# Patient Record
Sex: MALE | Race: WHITE
[De-identification: names, ages, dates, MRNs, and addresses within clinical notes are randomized per-mention and may not be internally consistent; named-entity substitution may affect disease eponyms.]

---

## 2020-08-18 NOTE — NUR
PATIENT ADMITTED FROM ER WITH UTI, DELIRIUM. PATINT CONFUSED, CAN ANSWER SOME
QUESTIONS ASKED. HAD TO CALL WIE TO FINISH THE ADMISSION.], WIFE WILL BE HERE
TOMORROW TO SIGN MEDICARE FORM AND FALL FORM, CONSENT FOR TREATMENT AND
PATIENT RIGHTS VERBALLY SIGNED IN ER. PATIENT C/O PAIN WITH LOW BACK, 6/10.
PATIENT IS A FALL RISK, FELL YESTERDAY. WIFE REPORT PATIENT HAS HAD NO FOOD X
2 DAYS OR MEDS X 2 DAYS. ADMISSION DONE, WILL REPORT TO BONIFACIO/SURY.

## 2020-08-18 NOTE — EKG
[facility]
[address]
[place]   [address]                     ELECTROCARDIOGRAM REPORT      
_______________________________________________________________________________
 
Name:       IRIS HAND      Room #:                     [facility]#:      [id number]                       Account #:      [id number]  
Admission:  20    Attend Phys:                          
Discharge:              Date of Birth:  [birth year]  
                                                          Report #: [id number]
                                                                    [id number]
_______________________________________________________________________________
THIS REPORT FOR:  
 
cc:  FAM - Family physician unknown
     FAM - Family physician unknown
     Pranav Cruz MD                                            ~
THIS REPORT FOR:   //name//                          
 
                         [facility] ED
                                       
Test Date:    2020               Test Time:    14:11:22
Pat Name:     IRIS HAND           Department:   
Patient ID:   [id number]            Room:          
Gender:                               Technician:   SATYA THOMASULISSES
:          1945               Requested By: Brent Riggs
Order Number: [id number] MD:   Pranav Cruz
                                 Measurements
Intervals                              Axis          
Rate:         77                       P:            -30
MS:           121                      QRS:          -86
QRSD:         150                      T:            18
QT:           429                                    
QTc:          486                                    
                           Interpretive Statements
Sinus rhythm
Probable left atrial enlargement
RBBB and LAFB
Left ventricular hypertrophy
Inferior infarct, old
Lateral leads are also involved
Baseline wander in lead(s) V2
Compared to ECG 2015 09:23:19
Left anterior fascicular block now present
Left ventricular hypertrophy now present
Myocardial infarct finding still present
Electronically Signed On 2020 16:08:32 CDT by Pranav Cruz
[email or web address]
 
 
 
 
 
 
 
 
  <ELECTRONICALLY SIGNED>
   By: Pranav Cruz MD        
  20     1608
D: 20 141                           _____________________________________
T: 20 141                           Pranav Cruz MD          /EPI

## 2020-08-19 NOTE — NUR
PT ADMITTED RELATED TO UTI, AMS, FALLS, AND DELIRIUM. CM ATTEMPTED PC TO PT'S
SPOUSE WHO WAS AT BEDSIDE. CM EXPLAINED CM ROLE. SHE INDICATED THAT SHE IS TOO
OVERWHELMED TO SPEAK TO CM AT THIS TIME. CM REVIEWED PT'S CHART AND STHERAPY
EVALS INDICATE THAT  PT. LIVES WITH WIFE IN A 2 STORY HOME, [address]. PT. PT.
REQUIRES DEPENDENT ASSIST WITH BATHING, DRESSING, AND TOILETING AT BASELINE.
INDEP. WITH TRANSFERS AND EATING AT BASELINE. SPOUSE COMPLETES IADLS. CM TO
ATTEMPT TO FOLLOW UP WITH PT AND SPOUSE REGARDING POSSIBLE DC NEEDS. DR CAMERON
SAW PT AND INDICATED THAT [facility] DOESN'T HAVE ANY OPEN BEDS AND THAT PT ISN'T
APPROPRIATE FOR ADMISSION AT THIS TIME. AWAITING DETERMINATION REGARDING
TREATMENT AND PLAN OR CARE. CM TO FOLLOW AS INDICATED WITH DC PLANNING.

## 2020-08-19 NOTE — NUR
Received awake on bed. Pt very irritable and verbally aggressive to staff;
very confused. On MS, not on telemetry. On room air. On regular diet-
tolerating well, able to feed himself; no nausea, no vomiting and no abdominal
pain ntoed. On blood sugar monitoring- taken and recorded accordingly; with
sliding scale insulin ordered- given as prescribed. Incontinent of bowel and
bladder, checked frequently and changed as needed. Falls bundle in place- pt
fell at home. With SL at  AC. Assisted in ADLs.
A/w PT/OT/ST/Psych evaluation.

## 2020-08-19 NOTE — NUR
ASSUMED PT CARE AT 1938. PT IS ALERT TO SELF AND FORGETFUL. PT IS PLEASANTLY
CONFUSED ONCE REDIRECTED. PT IS BEDREST. IN THE MIDDLE OF THE NIGHT THE PT HAD
MOMENTS OF CONFUSION. PT CALLS OUT IN THE HALLWAY. WHEN REPOSITIONING THE PT
COMPLAINED OF PAIN AT A LEVEL 7. WHEN OFFERD MEDS FOR PAIN HE REFUSED TO TAKE
ANYTHING FOR PAIN. WHEN I TRIED TO DISCUSS MEDS WITH HIM HE TOLD ME THAT HE
DID NOT NEED ANYTHING FROM ME BECAUSE HE AHS HIS OWN MEDS. I WAS TOLD IN
REPORT THAT HIS WIFE WOULD  HOME MEDS AND [place]ALLET ON 8/19/20 AROUND 9AM.
PT REFUSED TO TAKE HIS IV ABX UNTIL AFTER 10PM. PT IS RESTING IN HIS ROOM WILL
CONTINUE TO MONITOR.

## 2020-08-20 NOTE — NUR
PATIENT AOX1 CONFUSED AND FORGETFUL. PATIENT TOOK MEDS WITH ALOT OF
ENCOURAGEMENT. PATIENT WAS YELLING WITH INCREASED ANXIETY LOOKING FOR THE
WIFE. PATIENT WAS NOT EASY TO REDIRECT PRN HADOL GIVEN PER DR. ORDER. PATIENT
ENCOURAGED FLUIDS. PATIENT DENIED PAIN OR DISCOMFORT. PATIENT IN BED ASLEEP AT
THIS TIME BREATHING REGULAR AND UNLABOURED.

## 2020-08-20 NOTE — NUR
Assumed pt care this am, was mostly pleasant through out the day diet and
medications are tolerated well. Wife at the bed side, psych meds are with good
effect on the mood. Incontinent of bowel and bladder, aimee care given. Wife
at the bedside. Repostioned and frequent turns done. Blood sugar checks were
refused at times. POC followed, endorsed to the night nurse.

## 2020-08-20 NOTE — NUR
STILL AWAITING CULTURES. DR. CAMERON FOLLOWING NEWLY PERSCRIBED MEDS. CM TO
FOLLOW AS INDICATED WITH DC PLANNING.

## 2020-08-21 NOTE — NUR
ASSUMED CARE OF PATIENT AT SHIFT CHANGE. ASSESSMENT AS CHARTED. MEDICATIONS
GIVEN PER MAR. VSS. PATIENT IS ALERT TO HIMSELF. PATIENT IS INCONTINENT OF
BOWEL AND SOMETIMES BLADDER. FSBS ARE HIGH; INSULIN GIVEN AS ORDERED. PATIENT
HAS A GOOD APPETITE. C/O PAIN ON BOTTOM. PATIENT IS BEING TURNED EVERY 2
HOURS. ABX GIVEN FOR UTI. URINE IS LIGHTER AND LESS FOUL SMELLING. SPOUSE IS
AT BEDSIDE HELPING W ADL'S. PATIENT VOICES NO OTHER NEEDS. WILL CONTINUE TO
MONITOR

## 2020-08-21 NOTE — NUR
PATIENT ASSESSED AND IS ALERT X 1. SKIN WARM AND DRY. RESP EVEN AND UNLABORED.
TAKES MEDS OK. BUT YELLS OUT FOR WIFE MOST OF THE NIGHT. LUNGS CTA-DISM. HAD 3
LOOSE STOOLS LAST NIGHT INCONT. ALSO WAS INCONTOF URINE X 3. LEFT SL FLUSHES
WELL. IS ON BEDREST. INCONT OF BOWEL NAD BLADDER. HAS A UTI. LEFT AC SL
FLUSHES WELL. AND LOOKS HEALTHY. BED ALARM ON. IS IMPULSIVE AT TIMES. LUNGS
CTA-DISM,. TAKES MEDS WHOLE WITH WATER. HALDOL GIVEN AT HS FOR YELLING ALL THE
TIME. FINALLY WENT TO SLEEP AT 0400. DENIES  AND NEEDS. CONT PLAN OF CARE.
CONT ANTIBIOTIC THERAPY.

## 2020-08-21 NOTE — HC
[facility]
[address]
[place]   [address]                     CONSULTATION                  
_______________________________________________________________________________
 
Name:       IRIS HAND      Room #:         461-P       ADM IN  
M.R.#:      [id number]                       Account #:      [id number]  
Admission:  08/18/20    Attend Phys:    Silvestre Delgadillo MD     
Discharge:              Date of Birth:  [birth year]  
                                                          Report #: [id number]
                                                                    [id number]   
_______________________________________________________________________________
THIS REPORT FOR:  
 
cc:  MYRTLE - Family physician unknown
     MYRTLE - Family physician unknown                                       
     Florentino Mensah MD                                         ~
CC: MYRTLE unknown
    Silvestre Delgadillo
 
DATE OF SERVICE:  08/20/2020
 
 
ENDOCRINE CONSULTATION NOTE
 
CONSULTING PHYSICIAN:  Dr. Delgadillo.
 
REASON FOR CONSULTATION:  Uncontrolled type 2 diabetes mellitus.
 
HISTORY OF PRESENT ILLNESS:  This is a 75-year-old male patient whose medical
background is significant for multiple issues including hypertension,
hyperlipidemia, type 2 diabetes mellitus, as well as CAD, status post CABG, who
presented to [facility] with behavioral changes, agitation, as
well as tendency for falls.
 
The patient has had type 2 diabetes mellitus for several years and is maintained
on a regimen of glimepiride 4 mg daily, metformin 1000 mg daily and sitagliptin
50 mg daily.  The patient reports adequately controlled blood glucose values
mostly in the low to mid 100 mg/dL range without issues of hypoglycemia.  The
patient is not aware of difficulties pertaining to diabetic retinopathy or
neuropathy.  He is not aware of CKD difficulties.  However, he does have CAD and
is status post coronary artery bypass graft.
 
He is also hyperlipidemic and is maintained on atorvastatin 20 mg daily.  He has
hypertension and is maintained on carvedilol 25 mg b.i.d. as well as furosemide
40 mg daily.
 
REVIEW OF SYSTEMS:
CONSTITUTIONAL:  Fatigue, tiredness.  No weight changes, fever or chills.
HEENT:  Negative for sore throat, sinus pain or ear drainage.
PULMONARY:  Negative for shortness of breath, cough or hemoptysis.
CARDIAC:  Negative for chest pain, palpitations, syncope or presyncope.
GASTROINTESTINAL:  Negative for abdominal pain, nausea, or vomiting.
NEUROLOGY:  Negative for loss of consciousness, frequent severe headaches or
seizure activity.
DERMATOLOGY:  Negative for rash, ulceration or skin discoloration.
Otherwise, his review of systems noncontributory other than those mentioned in
HPI.
 
 
 
[facility]
[address] [place]   [address]                     CONSULTATION                  
_______________________________________________________________________________
 
Name:       YAZANIRIS XAVIER      Room #:         461-P       [facility] [place]  
[facility].#:      [id number]                       Account #:      [id number]  
Admission:  08/18/20    Attend Phys:    Silvestre Delgadillo MD     
Discharge:              Date of Birth:  [birth year]  
                                                          Report #: [id number]
                                                                    [id number]   
_______________________________________________________________________________
 
PAST MEDICAL HISTORY:
1.  Type 2 diabetes mellitus.
2.  Hypertension.
3.  Hyperlipidemia.
4.  Coronary artery disease, status post coronary artery bypass graft.
5.  Glaucoma.
6.  History of peptic ulcer disease.
 
OUTPATIENT MEDICATIONS:  Include:
1.  Famotidine 20 mg b.i.d.
2.  Losartan 50 mg daily.
3.  Metformin XR 1000 mg daily.
4.  Atorvastatin 20 mg daily.
5.  Lasix 40 mg daily.
6.  Carvedilol 25 mg b.i.d.
7.  Glimepiride 4 mg daily.
8.  ____ 5 mL.
9.  Aspirin 81 mg daily.
10.  Sitagliptin 50 mg daily.
11.  Xarelto 20 mg daily.
12.  Lumigan 0.01% in evenings.
13.  Vitamin D3 1000 units b.i.d.
 
ALLERGIES:  No known drug allergies.
 
FAMILY HISTORY:  Noncontributory.
 
SOCIAL HISTORY:  The patient is , lives with his wife.  Denies use of
tobacco, alcohol or illicit drugs.
 
PHYSICAL EXAMINATION:
GENERAL:  Pleasant  male patient who is not in apparent pain or
distress.
VITAL SIGNS:  Blood pressure is 132/68 mmHg, heart rate is 69 beats per minute,
respirations 18 per minute, temperature 36.7 degrees Celsius.
CONSTITUTIONAL:  The patient is lying in bed, seems comfortable, not in pain or
distress.
HEENT:  Anicteric sclerae.  Intact extraocular motions.
NECK:  Supple, without JVD or thyromegaly.
CHEST:  Noted for moderate air entry bilaterally with scattered rales.  No
wheezes or crackles.
HEART:  Regular rate and rhythm without murmurs or gallops.
ABDOMEN:  Soft, lax.  No tenderness or organomegaly, has active bowel sounds.
EXTREMITIES:  Lower extremity exam is noted for trace ankle edema bilaterally
without open wounds or skin breaks.
 
 
 
[place] [address]                     CONSULTATION                  
_______________________________________________________________________________
 
Name:       IRIS HAND      Room #:         461-P       ADM IN  
M.R.#:      [id number]                       Account #:      [id number]  
Admission:  08/18/20    Attend Phys:    Silvestre Delgadillo MD     
Discharge:              Date of Birth:  [birth year]  
                                                          Report #: [id number]
                                                                    [id number]   
_______________________________________________________________________________
NEUROLOGIC:  Awake, alert, moves all extremities spontaneously, largely
nonfocal.
PSYCHIATRIC:  Interactive, pleasant.  Normal mood and affect.
 
LABORATORY RESULTS:  Blood glucose values on arrival was 291, then 386 and most
recently at 112, then 174 mg/dL.  Sodium 140, potassium 3.5, chloride 104, CO2
of 23, anion gap 13, BUN 21, creatinine 0.9, glucose 278, total bilirubin 0.9,
calcium 8.9, magnesium 1.7, alkaline phosphatase 110, ALT 30, total protein 6.9,
albumin 3.2.  EGFR 83.  Lactic acid 1.1.  .  INR 1.0.  White blood count
10.8, hemoglobin 13.3, hematocrit 39.7, platelets 227.  TSH 1.418.  Hemoglobin
A1c is 10.8%.  Vitamin D 19.7.
 
ASSESSMENT AND PLAN:
1.  Type 2 diabetes mellitus.  As noted above, the patient has an uncontrolled
baseline as per his hemoglobin A1c, which is in disconnect with his reported
well controlled blood glucose values.  Hemoglobin A1c is 10.8%.  Since
admission, the patient has been gradually placed back on his outpatient regimen
including with metformin 1000 mg daily as well as glimepiride 4 mg daily.  His
blood glucose values have responded adequately with ____ gradually into target
range.  I would like to introduce linagliptin in lieu of Januvia at 5 mg daily. 
As I do so, I will taper down his glimepiride to 2 mg daily to avoid possibility
of hypoglycemia.  I will maintain the Humalog supplemental scale in low
intensity to be utilized as needed for hyperglycemia as we continue to check his
blood glucose values a.c. and at bedtime.
2.  Hypertension.  The patient's level of blood pressure control is adequate, he
is to continue with the current regimen.
3.  Hyperlipidemia.  The patient is maintained on atorvastatin therapy and
tolerates it well, he is to continue with the same.
4.  Vitamin D deficiency, moderate, the patient would benefit from high dose
vitamin D therapy and follow up to ensure the attainment of adequate vitamin D
levels.
 
I certainly appreciate this consultation by Dr. Delgadillo.
 
I have reviewed the patient's clinical care notes, laboratory data and other
pertinent clinical information for over 35 minutes in addition to my
conversation an encounter with the patient.
 
 
 
 
 
 
 
  <ELECTRONICALLY SIGNED>
   By: Florentino Mensah MD         
  08/21/20     1303
D: 08/20/20 1124                           _____________________________________
T: 08/20/20 1236                           Florentino Mensah MD           /nt

## 2020-08-22 NOTE — NUR
PATIENT ALERT AND ORIENTED X1. CONFUSED AND AGITATED. YELLING OUT FOR HIS WIFE
AND ANYTHING THAT HE NEEDS. ATTEMPTED TO REORIENT SEVERAL TIMES. INCONTINENT
OF B/B. GIVEN HALDOL AND SEROQUEL WHICH CALMS PATIENT FOR A COUPLE OF HOURS.
INCREASED BLOOD PRESSURE TREATED. WILL MONITOR.

## 2020-08-22 NOTE — NUR
Received awake on bed, verbally abusive and trying to hit RN and PCA, tried to
talk to the patient to calm him down but he remained aggressive and restless-
Dr Arita informed; PRN haldol given. On room air. Vital signs stable. On
regular diet- tolerating well; no nausea, no vomiting and no abdominal pain.
Able to swallow meds w/o difficulty. Incontinent of bowel and bladder, checked
regularly and changed as needed. With SL at L wrist- intact and flushing well;
on IV antibiotics. Pt calmed down for a while after AM dose of haldol but
became very restless again this afternoon, wife present in the room- Dr Arita informed and seen patient as well; PRN medication given as
prescribed. On blood sugar monitoring taken and recorded; with sliding scale
insulin ordered- given as prescribed. A/w for Dr Carter's rounds.
To continue monitoring patient.

## 2020-08-23 NOTE — NUR
ASSUMED CARE OF PATIENT AT SHIFT CHANGE. ASSESSMENT AS CHARTED. MEDS GIVEN PER
MAR. BP ELEVATED BUT IMPROVED AFTER RETAKING. MENTAL STATUS CHANGED OFTEN
TODAY. PROVIDER NOTIFIED. PT IS NOW A&OX4; ATE 75% OF MEAL AND SEEMS TO BE
IMPROVING. VOICES NO PAIN OR OTHER NEEDS AT THIS TIME. ABX INFUSING. FALL
PRECAUTIONS IN PLACE. SPOUSE AT BEDSIDE. WILL CONTINUE TO MONITOR

## 2020-08-23 NOTE — NUR
PATIENT ALERT AND ORIENTED X1. REMAINS ON BEDREST. BS MONITORED PER ORDER.
CONFUSION OFF AND ON DURING THE DAY AND NIGHT. MEDICATED WITH SEROQUEL AND
HALDOL PRN. BP ELEVATED AT TIMES, PRN AVAILABLE. WILL MONITOR.

## 2020-08-24 NOTE — NUR
UPDATE; ASSUMED CARE OF PATIENT AT SHIFT CHANGE. ASSESSMENT AS CHARTED. MEDS
GIVEN PER MAR. VSS. PATIENT STARTED THE MORNING CLEAR AND ALERT HOWEVER
DECLINED VERY QUICKLY AFTER SPAEKING W BEHAVIORAL HEALTH TEAM. PATIENT REFUSED
HIS MEDICATIONS AND CARE. PATIENT WAS TRYING TO PULL HIS IV OUT. COVID TEST
HAS BEEN DONE. PATIENT TO TRANSFER TO [facility] UPON COVID RESULTS. SPOUSE AT
BEDSIDE. WILL COTINUE TO MONITOR AND FOLLOW PLAN OF CARE

## 2020-08-24 NOTE — NUR
PATIENT ALERT AND ORIENTED TO SELF. HE IS MUCH MORE CLEAR TONIGHT WITH HIS
THINKING. HAS NOT BEEN YELLING OUT AND FOLLOWS SIMPLE COMMANDS. SLEEPING MOST
OF THE NIGHT AT TIME OF NOTE. WIFE AT BEDSIDE IN EARLY EVENING. WILL MONITOR.
RESTING QUIETLY. INCONTINENT OF BM X1.

## 2020-08-25 NOTE — NUR
COVID TEST RESULTS TANESHA BACK AND NEGATIVE. PT IS AGAIN A LITTLE CLEARED THIS
AM POTENTIALLY CONSENTING TO ADMISSION TO [facility] THIS DAY. SPOUSE IS COMPLETEING
AFFIDAVIT. CM TO FOLLOW AS INDICATED WITH DC PLANNING.

## 2020-08-25 NOTE — NUR
Received awake on bed. Due medications given as prescribed, able to swallow
meds w/o difficulty. On room air. Vital signs stable. On MS, not on telemetry;
no complaints of chest pain, crushing sensation and heaviness. On regular
diet- tolerating well; no nausea, no vomiting and no abdominal pain noted.
On blood sugar monitoring, taken and recorded accordingly; with sliding scale
insulin ordered- given as prescribed. Able to have a bowel movement today-
charted. Incontinent of bowel and bladder- checked frequently and changed as
needed. Falls bundle in place. Turned on his sides regularly, may refuse at
times. With SL at L FA- intact. Pt with episodes of confusion and agitation;
verbally abusive to staff. Possible discharge SBU, a/w affidavit to be signed
by pt's wife. Covid swab results came back- negative- pt's wife informed as
well as Dr Arita, physician present in the worthy when Covid result came out.
Assisted in ADLs.
To continue monitoring patient.
Pt seen and examined by Dr Carter, physician talked to pt's wife.
Still a/w discharge disposition from hospitalist and CM input.
Pt complained of pain on his buttocks- his hemmorrhoids- Dr Arita informed;
prescription obtained- medication given as prescribed.

## 2020-08-25 NOTE — NUR
CARE ASSUMED AT 1900 PATIENT WAS CONFUSED AND YELLING.PATIENTN AOX1
THIS SHIFT.PATIENT WAS TALKING TO UNSEEN OTHER. ANXIETY NOTED, JOSE GOLDEN
ORDER OF ATIVAN.PATIENT ENCOURAGED FLUIDS. PATIENT INCONTIENT PERICARE AND
BARRIER CREAM APPLIED.  FALL PRECAUTION IN PLACE. PATIENT IN BED ASLEEP AT
THIS TIME BREATHING REGULAR AND UNLABOURED.

## 2020-08-26 NOTE — NUR
OVIDIO completed 96 hour documents for pt yesterday evening. There was not a
notary in the building so OVIDIO wrote a note to the court and to Kurt Perkins that
several attempts to locate one were made, but they had all left for the day.
OVIDIO then faxed these documents to the court.
 
OVIDIO paged the  on duty for a notary.
 
SW team will continue to follow pt during his stay on this unit.,

## 2020-08-26 NOTE — NUR
PATIENT WAS IN BED AWAKE WHEN CARE ASSUMED. PATIENT IS ALERT, AND ORIENTED X
3-4  ABLE TO VOICE NEED. PATIENT IS CALM, PLEASANT COOPERATIVE WITH CARE.
PATIENT REQUIRES ASSIST OF 2-3 STAFF TO TRANSFER. PATIENT ASSISTED TO
[facility] FOR BREAKFAST. PATIENT WAS VERY HAPPY TO GET OUT OF BED, " IT FEELS
GREAT TO GET OUT BED I HAVE NOT GOTTEN OUT OF BED IN A LONG TIME". PATIENT
CONSUMED ABOUT 75% BREAKFAST.  PATIENT HAS HAD LOOSE STOOL X 2, WOUND NOTED TO
BUTTOCKS AREA, NURSE PRACTIONER (SOCORRO) NOTIFIED FOR POSSIBLE WOUND CARE
CONSULT, DR. NEWMAN AWARE. PATIENT PUT BACK TO BED AFTER GOOD PERICARE BY
STAFF TO GET PRESSURE OFF BUTTOCKS. LUNCH SERVED AT BEDSIDE, HE CONSUMED ABOUT
75%. BLOOD SUGAR BEFORE BREAKFAST 100, FOR LUNCH BLOOD SUGAR IS 96, NO INSULIN
GIVEN AT THIS TIME. PATIENT DENIES SUICIDAL/HOMICIDAL IDEATION. PATIENT DENIES
DEPRESSION/ANXIETY "IT JUST FEEL GREAT TO TALK TO PEOPLE". PATIENT CONCERN IS
"FALL". PATIENT DENIES HAVING PHYSICAL PAIN, DENIES AUDITORY/VISUAL
HALLUCINATION. CURRENTLY IN BED RESTING. AFFECT IS APPROPRIATE, MOOD IS
DEPRESSED. NO AGGRESSION OR AGITATION NOTED.  NO SIGN OF ACUTE DISTRESS NOTED
AT THIS TIME, WILL MONITOR FOR SAFETY.

## 2020-08-26 NOTE — NUR
Assumed care of patient this pm shift. Patient was a new admit to this floor
and came to [facility] around 1800 hours. Patient was yelling and stated that he does
not belong on a mental health floor. Patient was very frustrated at the
beginning of the shift. Patient does have a quarter sized pressure wound in
the crack of his upper buttock on the right. Patient also had diarrhea this
shift that was green and slimy in nature with no apparent smell. Patient is
being seen for dementia, hallucinations, and thoughts of impending doom such
as being in an airplane crash. Patient has also been verbally and physically
aggressive (info from patients notes). Patient will be a 96 hour hold per
affidavits. Patient is considered a falls risk per cates scale. Patient is
alert and oriented x2, person and place. We will attempt to help balance
patients medications for optimal living. We will continue to monitor per
hospital protocol.

## 2020-08-27 NOTE — NUR
assumed care evening 8/26 approx 1900. pt lying in bed at change of shift. pt
appears alert and oriented x4, cooperative. pt incontinent of urine assisted
with linen change. pt took hs meds with water willingly, denied complaints. pt
appears to be sleeping soundly with rounding checks. bed alarm on, will
continue to monitor.

## 2020-08-27 NOTE — NUR
ASSUMED CARE FROM DAY SHIFT PT LYNING IN BED, ALERT TO SELF ,BUT CONFUSED TO
PLACE AND SITUATION. PT CALM AND COOPERTIVE ABLE TO TAKE HS PO MEDICATON.,
REPOSITION WITH STAFF ASSISSTANCE  DUE PT INABILITY TO MOVE SELF. SON CALLED
AND WANTED UPDATE ON PT, AND DISCUSSED PLAN OF CARE , VERBALIZED UNDERSTANDING
AND WILL FOLLOW UP IN AM WITH  DOCTOR.

## 2020-08-27 NOTE — NUR
OVIDIO contacted pt's wife. No answer. OVIDIO left [facility].
 
SW team will continue to follow pt during his stay on this unit.,

## 2020-08-27 NOTE — NUR
PATIENT WAS IN BED AWAKE WHEN CARE ASSUMED, ASSISTED UP IN GERICHAIR WITH
ASSIST OF TWO STAFF. PATIENT PROPELLED BY STAFF TO DAYROOM, APPETITE GOOD,
CONSUMED 100% BREAKFST. PATIENT TOOK ALL MORNING MEDICATION WHOLE WITHOUT
DIFFICULTY. PATIENT IS CALM, PLEASANT, COOPERATIVE WITH CARE AT THIS TIME.
PATIENT DENIES SUICIDAL/HOMICIDAL IDEATION, WHEN ASKED ABOUT DEPRESSION,
PATIENT STATES "I DON'T FEEL DEPRESSED, I DON'T THINK I AM ANXIOUS EITHER, I
WOKE UP, TALKING, AND SMILING AT PEOPLE". AFFECT IS FLAT, MOOD IS DEPRESSED,
PT WORKING WITH PATIENT, AND HE IS MAKING PROGRESS PER PT REPORT. NO
SIGN OF ACUTE DISTRESS NOTED AT THIS TIME, WILL MONITOR FOR SAFETY.

## 2020-08-28 NOTE — NUR
Assumed care 0700.  Intervals of irritability, anger. 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  At one point in the
afternoon he was incontinent of stool small amount/smear.  Was adamant about
getting  cleaned up yet he took off his blue pants and brief.  His daughter in
law and wife called for an update-given one briefly.  LAUREN Haile was going to
call them as they mentioned being proactive talking with a  for
emergency guardianship proceeding. At one point he stood up from recliner and
put himself on the floor.  He is very confused.  Oriented only to person.  Was
angry when asked about SI/HI-raised  his voice. 3 staff to clean his buttocks
x 1. A special Low Air Loss bed was ordered and patient was moved to room 517.

## 2020-08-28 NOTE — NUR
Respiratory was called to remind and request of patient's respiratory
treatment for 1900--at 1830.  Reply they would get to pt. as they could.

## 2020-08-28 NOTE — HC
[facility]
[address]
[place]   [address]                     CONSULTATION                  
_______________________________________________________________________________
 
Name:       IRIS HAND      Room #:         521B-B      ADM IN  
M.R.#:      [id number]                       Account #:      [id number]  
Admission:  08/25/20    Attend Phys:    Digna Carter MD     
Discharge:              Date of Birth:  [birth year]  
                                                          Report #: [id number]
                                                                    [id number]   
_______________________________________________________________________________
THIS REPORT FOR:  
 
cc:  MYRTLE - Family physician unknown
     MYRTLE - Family physician unknown                                       
     Florentino Mensah MD                                         ~
CC: MYRTLE unknown
    Digna Carter
 
DATE OF SERVICE:  08/27/2020
 
 
ENDOCRINE CONSULTATION NOTE
 
CONSULTING PHYSICIAN:  Dr. Carter.
 
REASON FOR CONSULTATION:  Type 2 diabetes mellitus.
 
HISTORY OF PRESENT ILLNESS:  This is a 75-year-old male patient whose medical
background is significant for multiple medical issues including type 2 diabetes
mellitus, hypertension, hyperlipidemia, and CAD.
 
The patient was admitted originally to [facility] on 08/18/2020
with multiple falls, encephalopathy, worsening dementia and behavioral changes.
 
The patient's diabetes mellitus dates back to about 15 years and he is
maintained at home on a regimen of glimepiride 4 mg b.i.d., metformin 1000 mg
daily.  He describes well controlled blood glucose values at home without much
issue with hypoglycemia.  However, on entering the hospital, the patient had
some blood glucose fluctuations, which forced a change away from glimepiride on
to Lantus insulin as well as adding linagliptin to his regimen.  The patient was
vague as to whether or not diabetes mellitus was associated with documented
retinopathy or nephropathy; however, he did acknowledge the occurrence of
intermittent difficulties with numbness and tingling affecting his lower
extremities.
 
The patient is also hypertensive and is maintained on losartan 50 mg daily as
well as carvedilol 25 mg b.i.d.  He is hyperlipidemic and is maintained on
atorvastatin 20 mg daily.
 
Given his worsening encephalopathy and agitation, the patient was ultimately
admitted to the [facility] at [facility].
 
REVIEW OF SYSTEMS:
CONSTITUTIONAL:  Intermittent issues with fatigue, tiredness, but no fever or
chills or significant body weight changes.
HEENT:  Negative for sore throat, sinus pain or ear drainage.
 
 
 
[facility]
[address] [place]   [address]                     CONSULTATION                  
_______________________________________________________________________________
 
Name:       IRIS HAND      Room #:         521B-B      ADM IN  
M.R.#:      [id number]                       Account #:      [id number]  
Admission:  08/25/20    Attend Phys:    Digna Carter MD     
Discharge:              Date of Birth:  [birth year]  
                                                          Report #: [id number]
                                                                    [id number]   
_______________________________________________________________________________
PULMONARY:  Occasional shortness of breath, but no cough or hemoptysis.
CARDIAC:  Negative for chest pain, palpitations, syncope or presyncope.
GASTROINTESTINAL:  Negative for abdominal pain, nausea, vomiting.
NEUROLOGY:  Noted for imbalance, frequent falls.  No loss of consciousness or
seizure activity.
DERMATOLOGY:  Negative for rash, ulceration, discoloration or other issues.
Otherwise, review of system is noncontributory other than those mentioned in
HPI.
 
PAST MEDICAL HISTORY:
1.  Type 2 diabetes mellitus.
2.  Hypertension.
3.  Hyperlipidemia.
4.  Coronary artery disease, status post CABG, status post stent placements.
5.  GERD.
6.  Dementia.
 
OUTPATIENT MEDICATIONS:  Include glimepiride 4 mg b.i.d., losartan 50 mg daily,
metformin  taken as 1000 mg daily, atorvastatin 20 mg daily, carvedilol 25
mg b.i.d., Trusopt 5 mL daily, Xarelto 20 mg daily, vitamin D 1000 units b.i.d.,
multivitamin daily, and aspirin 81 mg daily.
 
ALLERGIES:  No known drug allergies.
 
FAMILY HISTORY:  Noncontributory.
 
SOCIAL HISTORY:  The patient is .  Denies use of tobacco, alcohol or
illicit drugs.
 
PHYSICAL EXAMINATION:
GENERAL:  An elderly  male patient who is not in apparent pain or
distress.  He is sitting in his chair comfortably, not in apparent distress.
VITAL SIGNS:  Blood pressure 141/86 mmHg, heart rate is 96 beats per minute,
respirations 20 per minute, temperature 37.1 degrees Celsius.
CONSTITUTIONAL:  The patient appears comfortable, not in apparent distress.
HEENT:  Anicteric sclerae.  Intact extraocular motions.
NECK:  Supple, without JVD.  No thyromegaly.
CHEST:  Noted for moderate air entry bilaterally with scattered rales.  No
wheeze or crackles.
HEART:  Regular rate and rhythm without murmurs or gallops.
ABDOMEN:  Soft, lax.  No guarding.  Active bowel sounds.
EXTREMITIES:  Lower extremity exam is negative for ankle edema.
NEUROLOGIC:  Awake, alert, stands with assistance, uses a walker.
PSYCH:  Flat mood and affect, but cooperative.
 
LABORATORY DATA:  Blood glucose values over the past 48 hours have been noted
 
 
 
[address]   [address]                     CONSULTATION                  
_______________________________________________________________________________
 
Name:       IRIS HAND      Room #:         521B-B      ADM IN  
M.R.#:      [id number]                       Account #:      [id number]  
Admission:  08/25/20    Attend Phys:    Digna Carter MD     
Discharge:              Date of Birth:  [birth year]  
                                                          Report #: [id number]
                                                                    [id number]   
_______________________________________________________________________________
for multiple declines under 100 mg/dL with the lowest being at 63 mg/dL earlier
this morning.  Sodium 142, potassium 4.1, chloride 107, CO2 of 26, anion gap 9,
BUN 24, creatinine 1.0, glucose 60, AST 21, total bilirubin 0.9, calcium 9.2,
magnesium 2.1, alkaline phosphatase 110, ALT 30, total protein 6.9, albumin 3.2,
EGFR 73.  Lactic acid 1.1.  Troponin negative.  White blood count 8.3,
hemoglobin 12.8, hematocrit 38.6, platelets 293.  TSH 1.418.  Hemoglobin A1c
10.8%.
 
ASSESSMENT AND PLAN:
1.  Type 2 diabetes mellitus.  Uncontrolled baseline as per his documented
hemoglobin A1c of 10.8%.  However, he had an issue with intermittent
hypoglycemia during this hospital stay, likely due to the dietary differential
from home.  This forced the stoppage of glimepiride in the past.  Currently, the
patient has had showing some multiple episodes of mild hypoglycemia.  I would
certainly target blood glucose range of 100-180 mg/dL.  I will continue the
current daily Glucophage dose of 1000 mg daily, hold linagliptin with the hopes
that he could potentially go home without needing to start this given its cost
and I will cut back his glimepiride dose to 2 mg twice a day in order to avoid
the issue of hypoglycemia.  Blood glucose monitoring will commence a.c. and at
bedtime and further therapeutic adjustments will be made accordingly.
2.  Hypertension.  The patient's level of blood pressure control is adequate,
continue the current regimen.
3.  Hyperlipidemia.  The patient is maintained on atorvastatin therapy, he is to
continue with the same.
 
I certainly appreciate this consultation by Dr. Carter.
 
 
 
 
 
 
 
 
 
 
 
 
 
 
 
 
 
 
  <ELECTRONICALLY SIGNED>
   By: Florentino Mensah MD         
  08/28/20     1255
D: 08/27/20 1159                           _____________________________________
T: 08/27/20 1657                           MD drake Escudero

## 2020-08-28 NOTE — HC
[facility]
[address]
[place]   [address]                     CONSULTATION                  
_______________________________________________________________________________
 
Name:       IRIS HAND      Room #:         521B-B      ADM IN  
M.R.#:      [id number]                       Account #:      [id number]  
Admission:  08/25/20    Attend Phys:    Digna Carter MD     
Discharge:              Date of Birth:  [birth year]  
                                                          Report #: [id number]
                                                                    [id number]   
_______________________________________________________________________________
THIS REPORT FOR:  
 
cc:  MYRTLE - Family physician unknown
     MYRTLE - Family physician unknown                                       
     Liam De Oliveira MD                                          ~
CC: MYRTLE unknown
    Digna Carter
 
DATE OF SERVICE:  08/27/2020
 
 
WOUND CARE CONSULTATION
 
PERSONAL PHYSICIAN:  Nonstaff.
 
CHIEF COMPLAINT:  Right gluteal ulcer.
 
HISTORY OF PRESENT ILLNESS:  This is a 75-year-old white male who was admitted
initially to [facility] on 08/18/2020 with recurrent falls and
acute toxic metabolic encephalopathy versus worsening dementia.  The patient was
treated for urinary tract infection with IV antibiotics.  While as an inpatient,
the patient was noted to have worsening encephalopathy with agitation. 
Psychiatry was consulted and the patient was then transferred to our acute
inpatient psychiatric unit.  Upon arrival to the unit, the patient was noted to
have a gluteal decubitus ulcer, which we have been asked to follow.  The patient
himself is pleasant at this time and states he does have mild pain associated
with the ulcer, but however, denies any other associated complaints.  The
patient states he has never had any other ulcers that he knows of.
 
PAST MEDICAL HISTORY:  Diabetes mellitus, coronary artery disease, status post
coronary artery bypass grafting, hyperlipidemia.
 
CURRENT MEDICATIONS:  Multiple, I reviewed the patient's medication list.
 
DRUG ALLERGIES:  None.
 
SOCIAL HISTORY:  The patient denies alcohol or tobacco use.  Prior to this
hospitalization was living independently with his wife.
 
FAMILY HISTORY:  Not pertinent to current medical condition.
 
REVIEW OF SYSTEMS:
CONSTITUTIONAL:  The patient denies fevers or chills.
NEUROLOGIC:  The patient has overall generalized weakness, but no isolated
weakness in arms or legs.
EYES:  No complaints.
 
 
 
[facility]
[address]
[place]   [address]                     CONSULTATION                  
_______________________________________________________________________________
 
Name:       IRIS HAND      Room #:         521B-B      ADM IN  
[facility].#:      [id number]                       Account #:      [id number]  
Admission:  08/25/20    Attend Phys:    Digna Carter MD     
Discharge:              Date of Birth:  [birth year]  
                                                          Report #: [id number]
                                                                    [id number]   
_______________________________________________________________________________
ENT:  No complaints.
CARDIAC:  The patient denies chest pain, palpitations, peripheral edema.
RESPIRATORY:  The patient denies shortness of breath, cough or wheezes.
GASTROINTESTINAL:  The patient denies nausea, vomiting, abdominal pain, but does
complain of a hemorrhoid.
GENITOURINARY:  The patient denies urgency or frequency, but recently was
treated for urinary tract infection.
MUSCULOSKELETAL:  No complaints.
SKIN:  The patient has a right stage 3 decubitus ulcer on the buttock.
 
PHYSICAL EXAMINATION:
VITAL SIGNS:  Temperature 37.1, pulse 96, respirations 20, /86.
GENERAL:  This is alert and oriented x 2, to person and place, but not time,
elderly white male who is in no obvious distress.
HEENT:  Normocephalic, atraumatic.  Mucous membranes are dry.  Pupils are round.
 Sclerae are white.
NECK:  Supple, nontender.
LUNGS:  Clear.
HEART:  Regular.
ABDOMEN:  Obese, soft, nontender.
PELVIC:  Evaluation of right superior gluteal region reveals a stage 3 decubitus
ulcer, which is a mix of approximately 80% granulation tissue, 20% slough. 
There is no significant undermining or tunneling.  Periwound is otherwise
intact.  There is no fluctuance.  There is minimal serosanguineous drainage
noted without odor.  There are no other associated ulcerations noted in the
sacrococcygeal region.
EXTREMITIES:  The patient moves all extremities without difficulty.  Bilateral
heels are intact.
NEUROLOGIC:  Cranial nerves 2-12 grossly intact.  Motor and sensory grossly
intact.
 
LABORATORY DATA:  White count 8.3, hemoglobin 12.8, BUN 24, creatinine 1.0,
albumin 3.2.
 
IMPRESSION:
1.  Stage 3 superior right gluteal decubitus ulcer.
2.  History of recent urinary tract infection with associated toxic
encephalopathy.
3.  Diabetes mellitus type 2 with recent hemoglobin A1c of 10.8.
4.  Generalized debility.
5.  History of coronary artery disease.
6.  Protein-calorie malnutrition -- moderate with albumin of 3.2.
 
PLAN:  At this time, we will start Z-Guard protective ointment to the ulceration
twice daily and p.r.n.  We will put the patient on low air loss mattress, having
turned every 2 hours.  We will utilize physical and occupational therapy for
 
 
 
[place] [address]                     CONSULTATION                  
_______________________________________________________________________________
 
Name:       IRIS HAND JR     Room #:         521B-B      ADM IN  
M.R.#:      [id number]                       Account #:      [id number]  
Admission:  08/25/20    Attend Phys:    Digna Carter MD     
Discharge:              Date of Birth:  [birth year]  
                                                          Report #: [id number]
                                                                    [id number]   
_______________________________________________________________________________
strengthening.  We will maximize patient's oral protein supplementation for
healing.  Continue all other current medications.  We will continue to follow
the patient.  I appreciate the ability to consult.
 
 
 
 
 
 
 
 
 
 
 
 
 
 
 
 
 
 
 
 
 
 
 
 
 
 
 
 
 
 
 
 
 
 
 
 
 
 
 
 
 
  <ELECTRONICALLY SIGNED>
   By: Liam De Oliveira MD          
  08/28/20     1456
D: 08/27/20 1629                           _____________________________________
T: 08/27/20 1904                           Liam De Oliveira MD            /nt

## 2020-08-28 NOTE — NUR
MULTIPLE ATTEMPTS TO COMPLETE EVALUATION HAVE BEEN MADE.  PATIENT IS
NON-COMPLIANT AT THIS TIME.  EVALUATION TO BE DEFERRED UNTIL PATIENT IS
MEDICATIONS HAVE BEEN STABILIZED AND BEHAVIORS HAVE BEEN ADDRESSED.

## 2020-08-28 NOTE — NUR
OVIDIO contacted Ana to provide an update on pt's current behavior and the
fact that P/T is recommending a skilled stay. Also, pt's nurse said that an
 would like to come and visit pt. OVIDIO was calling pt's family to
explain the unit is not accepting visitors at this time and suggest an
alternate way such as by video chat. No answer SW left a msg.
 
OVIDIO will continue to follow pt during his stay on this unit.
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
## 2020-08-29 NOTE — NUR
ASSESSMENT:
PT REMAIN ALERT AND ORIENT TIMES ONE. PT THINKS THAT HIS WIFE AND KIDS ARE
PLANNING HIM A SURPRISE GATHERING. HE WANTED TO SNEAK OVER TO HIS WIFES HOTEL
ROOM.  TOLERATING PO INTAKE. TAKE MEDS WHOLE. VSS, AFEBRILE.  DIFFICULT TIME
GETTING PT OUT OF BR, SECURITY WAS CALLED FOR ASSISTANCE. PT REMAINED SAFE
WITH NO FALLS.  ACCU CHECK  THIS SHIFT. 96 HR HOLD WHICH BEGAN 8/26/20.
SLOW PROGRESS TOWARDS DC GOALS, WILL CONTINUE TO MONITOR.

## 2020-08-30 NOTE — NUR
PT ALERT AND ORIENTED TIMES FOUR WITH BLUNTED AFFECT. VSS. DENIES PAIN/SOA.
PT ALSO DENEIS SI/HI/AH/VH. PT UP IN THE DINNING ROOM FOR MEALS. PT HAS LITLLE
INTERACTIONS WITH PEERS. WILL CONTINUE TO MONITOR.

## 2020-08-30 NOTE — NUR
PATIENT ALERT X 2. SKIN WARM AND DRY. RESP EVEN AND UNLABORED. ON CARB CONTROL
DIET. ATE WELL AND TAKEN HIS MEDS WELL.PUT IN BED. NEED ASSIST OF 2 PERSON.
BED ALARM ON. NO YELLING OUT TONIGHT. HAS A SORE ON RIGHT BUTTOCK. NEEDS
TURNED Q 2 HOURS. WAS IN A BETY CHAIR IN DINNING ROOM. ATE SUPPER. IS ON A LOW
AIR MATTRESS.NO VERBAL OR AGGERESION NOTED.NO SI/HI/AVH NOTED THIS SHIFT. HAS
BEED COOPERATIVE THIS SHIFT SO FAR. SLEEPING WELL.CONT PLAN OF CARE.

## 2020-08-30 NOTE — NUR
PATIENT WAS SITTING UP IN DINING ROOM WHEN I CAME ON SHIFT AT 1900. HE WAS
REQUESTING HIS HS MEDS ASAP AND STATING THAT HIS HEMORHOIDS WERE HURTING AND
HE WANTED CREAM FOR THEM. ASSESS PATIENT AND GOT HIS HS MEDS READY AND CALLED
SECURITY TO HELP TRANSFER PATIENT TO BED SINCE HE STATES HES UNABLE TO HELP
MUCH TONIGHT. PATIENT PLACED IN BED AND INCONTINENCE CARE DONE. CREAM APPLIED
TO HIS SCROTUM WHICH IS RED AND HAS EXCORIATED BREAKS IN SKIN. PATIENT WAS
PLACED ON HIS LEFT SIDE. AROUND 2100. PATIENT WOKE AT 2300 SCREAMING AND
PANIC'D. HE STATES THAT HE DOESN'T KNOW WHERE HE IS AND THAT HE JUST HAS TO
GET OUT OF THE BED AND LAY ON THE FLOOR. I TOLD HIM HE IS AT [facility]
AND HE TOLD ME I WAS LYING. HE STATES HE DOESN'T KNOW WHERE HE IS AT BUT HE
KNOWS IT'S NOT WHERE I'M TELLING HIM. THEN HE INSISTED THAT I PUT HIM ON THE
FLOOR. I TOLD HIM I COULDN'T DO THAT BECAUSE WE WOULD NEVER BE ABLE TO GET HIM
UP OFF THE FLOOR. I TRIED TO MAKE HIM MORE COMFORTABLE IN THE BED AND WAS
ADJUSTING THAT BUT HE JUST WANTED OUT OF BED. I CALLED CARLOS HOSKINS ON CALL AND
EXPLAINED PATIENT'S ANXIETY AND TOLD HIM I WAS TRYING TO KEEP HIM OFF HIS
BUTTOCKS SINCE HE'S BEEN UP MOST OF DAY SITTING IN CHAIR AND HE HAS SKIN
BREAKING DOWN. ASKED IF THERE WAS SOMETHING WE COULD GIVE TO CALM HIM AND KEEP
HIM IN BED. HE ORDERED GEODON 15MG IM ONE TIME.  WHILE ON PHONE WITH DOCTOR,
PATIENT MIRACULOUSLY STOOD UP OUT OF BED AND SAT HIMSELF IN A CHAIR. GOT TO
THE BED ALARM GOING OFF AS HE SAT DOWN.  PATIENT REFUSING TO GO BACK TO BED.
SAT PATIENT IN GERICHAIR WITH CHAIR ALARM ON AND SAT IT IN HALLWAY WHERE NURSE
CAN VIEW PATIENT. PATIENT GIVEN WATER ON REQUEST. GEODON INJECTION NOT GIVEN
AT THIS POINT D/T PATIENT IS RESTING QUIETLY. CONTINUING TO MONITOR.

## 2020-08-31 NOTE — NUR
PT UP AT THIS TIME. PT WAS INCON. OF URINE. PT STATED HE DIDN'T MAKE IT FAST
ENOUGH TO USE BATHROOM. PT TALKING ABOUT HOW HE NEEDS THE BED RAIL DOWN TO
HELP HIM GET UP. HE STATED THAT HE NEEDS HELP SITTING UP. PT HAS FADED RASH TO
LEFT SIDE OF ABD, PT HAS REDDNESS TO GROIN, MEDICATION WAS APPLIED AND BARRIER
CREAM. PT CLEANED UP AND PT ABLE TO TURN IN BED. PT TALKED TO WIFE ALSO ON THE
PHONE. PT ASSISTED TO CHAIR X2 STAFF. PT ABLE TO BEAR WT ON FEET, NOT ABLE TO
PIVOT VERY EASILY. PT TAKEN TO DINING ROOM AND ATE BREAKFAST. PT TOOK MED
WHOLE WITH WATER.

## 2020-08-31 NOTE — NUR
PATIENT AWOKE SITTING IN CHAIR AND SAID HE NEEDED TO HAVE BM. PATIENT ASSISTED
BACK TO ROOM AND ONTO BSC. NO RESULTS. PATIENT KEPT WANTING TO GET UP AND DOWN
OUT OF CHAIR AND NOT WANTING TO GO TO BED. PATIENT RESTLESS AND CONFUSED,
ARGUMENTATIVE. GEODON 15MG IM GIVEN IN LEFT HIP. PATIENT WANTED TO SIT IN
RECLINER TO REST. PATIENT MOVED OUT TO HALLWAY WHERE NURSE COULD MONITOR. HE
WAS PLACED IN QUIET PLACE. PATIENT SLEPT FOR ABOUT AN HOUR AND AWOKE. HE
CALLED OUT AND STATES HE WANTS TO KNOW WHAT IS GOING ON. HE SAID HE WANTED TO
BE WITH THE OTHER PATIENT'S. EXPLAINED THAT IT IS 0200 IN THE MORNING AND ALL
ARE SLEEPING. EXPLAINED HE WAS IN THE RECLINER IN THE HALLWAY TO SLEEP SO HE
COULD BE MONITORED IF HE NEEDED ANYTHING. HE WANTED TO WATCH TV. I TOLD HIM
THAT TV IS OFF AT 2200. HE WANTED TO KNOW WHY A DR HAS NOT SEEN HIM SINCE HE'S
BEEN HERE. I EXPLAINED THAT HE HAS BEEN SEEN BY A DR AND HE WILL SEE HIM TODAY
AFTER BREAKFAST. PATIENT KEEPS CALLING OUT AND DEMANDING THINGS. PATIENT WANTS
UP TO BSC AND BACK DOWN. HE WANTS WATER AND THEN DOESN'T WANT WATER. HE WANTS
UP IN CHAIR AND THEN DOWN IN ANOTHER CHAIR. PATIENT STATES HE IS COMFORTABLE.
CONTINUING TO WORK WITH PATIENT THRU THIS DISORIENTATION.

## 2020-08-31 NOTE — NUR
PT TALKED WITH SON PATRICK. PT DIDN'T REALIZE HE WAS AT [facility].
THEN PAGED DR. CAMERON ABOUT PHONE CALL.

## 2020-08-31 NOTE — NUR
OVIDIO contacted Ana on her cell; OVIDIO attempted to call her Friday afternoon
also. Ana again did not answer. OVIDIO left another msg. Pt is due to d/c
today.
 
SW team will continue to follow pt during his stay on this unit.

## 2020-09-01 NOTE — NUR
PT WALKED AROUND THE UNIT WITH PHYSICAL THERAPY. PT DID HAVE A BM, NEEDED
ASSISTANCE WITH WIPEING. PT DIDN'T SEEM CONFUSED TODAY. PT MORE COHEARENT.

## 2020-09-01 NOTE — NUR
PATIENT WAS UP IN DINING ROOM TONIGHT VISITING WITH HIS WIFE ON THE PHONE. HE
WAS HAPPY AND A/0X2-3. HE DENIED PAIN, SI/HI/AVH. PATIENT TOOK HIS HS MEDS
WHOLE WITH WATER. HE HAD HS SNACK. HIS BLOOD SUGAR . PATIENT REQUESTED
TO GO TO BED BEFORE 2200. PATIENT WAS TOILETED ON BS AND HAD SOFT UNFORMED
STOOL. HE IS INCONTINENT OF URINE AND BOWEL. ZGARD PASTE APPLIED TO PRESSURE
SORES ON SCROTUM AND RED AREA IN PERIANAL AREA. PATIENT PLACED IN BED ON CHUX
AND WITHOUT BRIEF TO LEAVE AREA CELE. PATIENT BECAME DISORIENTED AND CONFUSED
LAST NIGHT AND CONTINUES TO DO THIS AGAIN TONIGHT. HE SLEEPS FOR AN HOUR AND
THEN BEGINS YELLING OUT FOR HELP AND WHEN STAFF GETS TO HIM HE JUST WANTS TO
KNOW THE TIME OR HAVE THE LIGHTS ADJUSTED. ONCE HE TOLD ME THAT A LADY IN HER
50'S WHO WORKS WITH HIM IS GOING TO BE COMING IN AROUND 0530 AND THAT SHE IS A
PERSONAL WORKOUT  AND SHE WILL BE GLAD TO WORK WITH THE STAFF TOO ON
WORKING OUT. HE DOESN'T RECALL WHERE HE IS AT. HE WANTS UP OUT OF BED AND THEN
HE WANTS TO GO BACK TO BED. TONIGHT HE IS YELLING THAT HE WANTS TO GET UP AND
WORK AT THE DESK ON PAPERWORK TO HELP OUT. HE STATES HE WAS AN 
BEFORE AND KNOWS WHAT TO DO. HE WANTS ME TO CALL THE DR AND GET PERMISSION TO
ALLOW HIM TO WORK AT THE DESK.  PATIENT HELPED INTO BETY CHAIR WITH ALARM ON
AND IN PLACE AND BROUGHT TO DINING ROOM TO SIT. PATIENT STATES HE DOESN'T WANT
TO SLEEP. WILL GIVE PRN IF PATIENT REFUSES TO QUIET DOWN AND REST. CONTINUING
TO MONITOR.

## 2020-09-01 NOTE — NUR
Assumed pt's care this pm shift. Pt was in the dayroom at time of assessment.
Pt was alert and oriented x4 at time of assessment. Confused. Forgetful. Pt
was calm and cooperative with assessment. Denies anxiety and/or depression. Pt
voiced wanting to go home. Pt was not exit seeking as was in a caroline chair. No
attempts to get out of chair.  Pt took meds whole, stayed in the dayroom,
watched some [employer] baseball and went to bed.
Pt started yelling out at about 2240. Pt voiced he wanted to use the bsc. Pt
helped to bsc. Pt was very anxious at that time and getting agitated.  Voicing
that he did not want to go back to bed. He was scared and anxious. Pt wanted
meds to help with anxiety. PRN haldol given at that time. Pt voicied that he
wanted to sleep  in the recliner out in the dayroom because he was scared. Pt
now out in the dayroom, on the caroline chair sleeping. Pt have 2 small BMs so far
since beginning of shift.  Will continue to monitor.
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
## 2020-09-01 NOTE — NUR
PATIENT SLEPT 2.2 HOURS. PATIENT HAS RED RASH OVER TORSO AND THRU NETTIE AREAS.
ITCHING HAS DECREASED SOME AND HYDROCORTISONE CREAM IS SCHEDULED. DID PUT SOME
ON SOME ITCHING PATCHES TODAY. PT HAD SMALL SOFT UNFORMED BM TODAY. TWO TOTAL
FOR NIGHT. Z MARCELLO PASTE AND BARRIER CREAM APPLIED TO SCROTAL AREA LESIONS
WHICH ARE HEALING. [facility]. A/0 X 1-2.

## 2020-09-01 NOTE — NUR
PT UP IN DINING ROOM. PT HAS DELUSION ABOUT THINKING HE IS ON A ORTHOPEDIC
UNIT. PT TAKING MEDICATION AT THIS TIME WHOLE. PT NEEDS ASSISTANCE WITH
TRANSFERS AND ADL'S. PT LUNGS CLEAR. PT HAS A RASH TO ABD, BACK, BUTTOCKS, AND
PERIAREA. PT STATED HE HAS HEMMORROIDS.

## 2020-09-02 NOTE — NUR
PATIENT WAS SITTING AT A TABLE IN THE DINING ROOM TONIGHT WHEN I CAME ON DUTY
AT 1900. PATIENT IS A/0 X 2-3. PATIENT ASKED FOR THE PHONE TO CALL HIS WIFE.
HIS SON CALLED LATER AND SPOKE WITH HIM. HE DENIES SI/HI/AVH. PATIENT DENIES
PAIN. HE DID HAVE BM TODAY. PATIENT GIVEN ATIVAN .5 MG PO AT HS. PATIENT ASKED
TO USE THE BSC AND WAS HELPED IN USING THAT. PATIENT HAS RASH THRUOUT TORSO
THAT IS IMPROVING AND DENIES ITCHING AT THIS TIME. PT GETTING HYDROCORTISONE
CREAM SCHEDULED.  PATIENT'S PERIANAL/SCROTAL RED BREAKDOWN IMPROVING WITH
ZGARD AND BARRIER CREAM. PATIENT SLEEPS ON LOW LOSS AIR MATTRESS. BED ALARM IS
ON AND SIDERAILS UP X 3-4. PATIENT SPOKE WITH DR PATY PELAYO ABOUT
WANTING TO KNOW IF HE CAN GO HOME SOON.  TOLD HIM IT'S STILL UP IN THE AIR
AND HE NEEDS TO BE ABLE TO SLEEP THRU NIGHT AND IF HE GOES HOME AT THIS POINT
HE WILL NEED OUTSIDE HELP THRU NIGHT.  PATIENT HAS CALLED OUT A COUPLE OF
TIMES TONIGHT. HE SLEPT FOR AN HOUR AND THEN AWOKE CALLING OUT HIS WIFE'S NAME
AND DISORIENTED. PATIENT WAS ALSO INCONTINENT AT THIS TIME. INCONTINENT CARE
DONE AND PATIENT WAS REORIENTED TO PLACE AND TIME AND COMFORTED. PATIENT BACK
TO RESTING AGAIN IN BED. CONTINUING TO MONITOR WITH ROUTINE ROUNDING.

## 2020-09-02 NOTE — NUR
FREQUENT (Q 20-30 MINUTES) REQUESTS TO USE THE BATHROOM-BECOMES UPSET AND
BEGINS TO YELL AND FEEL PERSECUTED WHEN ASKED TO ADHERE TO A Q 2 HR TOILETING
SCHEDULE "YOU JUST WANT TO PICK ON ME" "YOU DON'T LIKE ME" DID ATTEND AM
ACTIVITIES AND APPEARED TO PARTICIPATE. ORIENTED TO PERSON/PLACE NO TO DATE.
REQUIRES SBA X 2-3 TO TRANSFERTO AND FROM CHAIR/COMMODE.

## 2020-09-02 NOTE — NUR
It was discussed in tx team that pt may be clearing up and may be able to sign
a DPOA doc. Dr. Carter said she will talk with pt and assess his cognition.
 
SW spoke to Dr. Carter who said pt is clearing up and clearly can identify what
a DPOA is. She said that if pt continues to get better, he can assign a DPOA
in the next day or 2. He can also decide if he wants to go to a SNF next. She
said she will return pt's son Nikolay ([phone]) who called SW and left a
msg this morning.
 
SW team will continue to follow pt during his stay on this unit.

## 2020-09-03 NOTE — NUR
SW spoke with pt's son Nikolay concerning pt and provided an update. Pt is
currently reviewing DPOA document. Nikolay mentioned him wanting pt to go to
[facility] for rehab. SW explained that pt has already told her that if he
cannot go to  he does not want to go. Nikolay said he will contact pt and
talk with him about rehab. Frank number is [phone].
 
SW team will continue to follow pt during his stay on this unit.

## 2020-09-03 NOTE — NUR
PT CARE ASSUMED AT 0700. A&Ox3. PT VERY CALM UP IN THE GERIRECLINER IN THE DAY
ROOM MOST OF THE MORNING. BM TODAYx2. VALPROIC ACID RESULTS 44 (L). ACHS BS
164 (B), 235 (L) WITH NO COVERAGTE ORDERED. PT/OT/ST ON BOARD. NO BEHAVIORAL
ISSUES NOTED THUS FAR. WOUND CARE COMPLEETED WITH MILD REDNESS NOTED.
SUPPLEMENTS ADDED TO DIET. VITALS STABLE. FALL PROTOCOL IN PLACE. CALL LIGHT
IN REACH. WILL CONTINUE TO MONITOR.

## 2020-09-03 NOTE — NUR
PATIENT HAS SLEPT THRU NIGHT, ONLY AWAKENED ONCE. PATIENT APPEARS COMFORTABLE
WITH EVEN UNLABORED RESPIRATIONS. BED IN LOW POSITION AND BED ALARM IS ON.
CONTINUING TO MONITOR.

## 2020-09-04 NOTE — NUR
PATIENT CARE ASSUMED AT 0700 - SPENT MOST OF DAY IN BETY CHAIR. MULTIPLE
VISITS TO VOID WITH ASSISTANCE. DIFFICULTY WITH WEIGHT BEARING ON HIS LEGS -
TWO PERSON ASSIST NEEDED WITH TRANSFERS AND TOLLETING - PATIENT HAS WOUND ON
BUTTOCKS THAT BARRIER CREAM BEING APPLIED. STATES NO DISCOMFORT - NO DRESSING
ORDERED - +3 EDEMA NOTED BILATERALLY IN LOWER EXTREMITIES. GOOD APPETITE WITH
COMPLETION OF BOTH BREAKFAST AND LUNCH. BLOOD SUGARS HAVE BEEN WITHIN NORM. 94
AT BREAKFAST  AT LUNCH. NEEDS ASSISTANCE WITH TRANSFERRING. PERIODS OF
CONFUSION NOTED.

## 2020-09-04 NOTE — NUR
Assumed care of pt @ 1900. Pt calm et cooperative this shift. Took medications
whole without difficulty. Ambulates via caroline-chair but can stand et transfer.
VSWNL. Accucheck elevated so pt received 10u. of regular insulin this shift.
Health assessment with no abnormalities noted other than previously noted.
Denies SI/HI this shift. Socialized with peers in dayroom until HS. Currently
resting in caroline-chair in dayroom with eyes open. Will continue to monitor per
protocol. DISCHARGE SUMMARY from Nurse    PATIENT INSTRUCTIONS:    After general anesthesia or intravenous sedation, for 24 hours or while taking prescription Narcotics:  · Limit your activities  · Do not drive and operate hazardous machinery  · Do not make important personal or business decisions  · Do  not drink alcoholic beverages  · If you have not urinated within 8 hours after discharge, please contact your surgeon on call. Report the following to your surgeon:  · Excessive pain, swelling, redness or odor of or around the surgical area  · Temperature over 100.5  · Nausea and vomiting lasting longer than 4 hours or if unable to take medications  · Any signs of decreased circulation or nerve impairment to extremity: change in color, persistent  numbness, tingling, coldness or increase pain  · Any questions    What to do at Home:  Recommended activity: Activity as tolerated    If you experience any of the following symptoms uncontrolled pain, please follow up with hospice nurse. *  Please give a list of your current medications to your Primary Care Provider. *  Please update this list whenever your medications are discontinued, doses are      changed, or new medications (including over-the-counter products) are added. *  Please carry medication information at all times in case of emergency situations. These are general instructions for a healthy lifestyle:    No smoking/ No tobacco products/ Avoid exposure to second hand smoke  Surgeon General's Warning:  Quitting smoking now greatly reduces serious risk to your health.     Obesity, smoking, and sedentary lifestyle greatly increases your risk for illness    A healthy diet, regular physical exercise & weight monitoring are important for maintaining a healthy lifestyle    You may be retaining fluid if you have a history of heart failure or if you experience any of the following symptoms:  Weight gain of 3 pounds or more overnight or 5 pounds in a week, increased swelling in our hands or feet or shortness of breath while lying flat in bed. Please call your doctor as soon as you notice any of these symptoms; do not wait until your next office visit. Recognize signs and symptoms of STROKE:    F-face looks uneven    A-arms unable to move or move unevenly    S-speech slurred or non-existent    T-time-call 911 as soon as signs and symptoms begin-DO NOT go       Back to bed or wait to see if you get better-TIME IS BRAIN. Warning Signs of HEART ATTACK     Call 911 if you have these symptoms:   Chest discomfort. Most heart attacks involve discomfort in the center of the chest that lasts more than a few minutes, or that goes away and comes back. It can feel like uncomfortable pressure, squeezing, fullness, or pain.  Discomfort in other areas of the upper body. Symptoms can include pain or discomfort in one or both arms, the back, neck, jaw, or stomach.  Shortness of breath with or without chest discomfort.  Other signs may include breaking out in a cold sweat, nausea, or lightheadedness. Don't wait more than five minutes to call 911 - MINUTES MATTER! Fast action can save your life. Calling 911 is almost always the fastest way to get lifesaving treatment. Emergency Medical Services staff can begin treatment when they arrive -- up to an hour sooner than if someone gets to the hospital by car. The discharge information has been reviewed with the patient and caregiver. The patient and caregiver verbalized understanding. Discharge medications reviewed with the patient and caregiver and appropriate educational materials and side effects teaching were provided. Patient armband removed and shredded.

## 2020-09-04 NOTE — NUR
OVIDIO received a call from both Ana and Nikolay Durand. Ana did not
answer and a vm did not . OVIDIO provided Nikolay an update on pt and that
Dr. Carter attempted to talk with pt about rehab today. Pt was not wanting to
speak to her at that time; he was focused on going to the restroom. Dr. Carter
said she will come back to the unit and speak to him again soon and attempt to
ask him. OVIDIO and  talked about possibly involving Nikolay via phone so that
Nikolay can hear pt's wishes and everyone can be on the same page.
 
OVIDIO will continue to follow pt during his stay on this unit.

## 2020-09-05 NOTE — NUR
Assumed care of pt @ 1900. Pt calm et cooperative this shift. Took medications
whole without difficulty. Ambulates with assistance of caroline-chair. VSWNL.
Health assessment with no abnormalities other than previously noted. Denies
SI/HI at present time. Socialized with peers in dayroom until HS. Currently
resting in bed with eyes closed. Will continue to monitor per protocol.

## 2020-09-05 NOTE — NUR
PATIENT CARE ASSUMED AT 0700 - UP IN DINING AREA. QUIET AND AGREEABLE. STATED
SLEPT WELL. REQUESTS MULTIPLE TRIPS TO VOID - NO OUTPUT ON ANY VISITS.
PATIENT NEEDS ALOT OF ENCOURAGEMENT TO ATTEND TO OWN NEEDS. DIFFICULTY WITH
WEIGHT BEARING AND 2 PERSON ASSIST MOST OF THE TIME. ADVISED PATIENT TO USE
URINAL BUT UNABLE TO HOLD IT HIMSELF. ADVISED DISCHARGING TODAY TO [facility]
REHAB. APPEARS TO BE PLEASED. MEDICATION COMPLIANT. BLOOD SUGAR 85 THIS
MORNING NOT REQUIRING ANY INSULIN. ABLE TO FEED ONESELF ADEQUATELY. ALERT AND
ORIENTED - PERIODS OF CONFUSION AT TIMES.

## 2020-09-05 NOTE — NUR
PATIENT DEPARTED VIA [facility] TRANSPORT TO FACILITY AT 1345. STAFF ESCORTED
TO ER WITH ALL PAPERWORK - PATIENT HAD NO BELONGINGS INFORMED THAT WIFE HAD
TAKEN ALL CLOTHES HOME ON ADMITTANCE. CALLED FACILITY AND LEFT WORD TO CALL
BACK FOR REPORT ON PATIENT. DOCUMENTATION AND PRESCRIPTION SENT WITH PATIENT
TO FACILITY AND GIVING TO TRANSPORTER. PATIENT ALERT AND AGREEABLE. PATIENT
HAD LAST MEDICATIONS ADMINISTERED OF FLOMAX 0.4 MG. ALONG WITH 2 MG. OF
HALDOL. TOLERATED WELL.

## 2020-09-05 NOTE — NUR
OVIDIO spoke to Lucrecia [phone] [facility]/[phone] fax with [facility] who
asked if pt was ready for discharge today. OVIDIO consulted with Dr. Beckford who
informed pt could dc today. OVIDIO notified uLcrecia and arranged dc with nursing
staff. [facility] will  pt at 1300 today. Discussed with pt's wife and
his son Nikolay/JESSENIA.
 
OVIDIO faxed dc orders and med list to Lucrecia.
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
## 2020-09-23 NOTE — NUR
PATIENT ADMITTED FROM ER WITH AMBULATORY DYSFUNCTION, PATIENT CAME FROM HOME,
UNABLE TO WALK. PER REPORT PATIENT HAS RETENTION, CHING CATHETER INSERTED IN
ER, CHING 12FRENCH COUDE. COREG NOT GIVEN IN ER PER BENITA/RN. PATIENT HAS
HEMORROIDS. PATIENT HAS CONFUSION/FORGETFULNESS. ALERT X 2.-3. LEFT AC 20G IN
PLACE. ADMISSON DONE AND COMPLETED, REPORT GIVEN TO JUAN/RN. OCTAVIO FELL IN
HALLWAY ON THE WAY FROM ER, AND THIS RN TOOK HER DOWN TO BE SEEN FOR KNEE
PAIN.

## 2020-09-23 NOTE — EKG
[facility]
[address]aur[address]
[place]   [address]                     ELECTROCARDIOGRAM REPORT      
_______________________________________________________________________________
 
Name:       IRIS HAND      Room #:                     REG [facility]#:      [id number]                       Account #:      [id number]  
Admission:  20    Attend Phys:                          
Discharge:              Date of Birth:  [birth year]  
                                                          Report #: [id number]
                                                                    [id number]
_______________________________________________________________________________
THIS REPORT FOR:  
 
cc:  MYRTLE - Vane family physician/PCP 
     MYRTLE - Vane family physician/PCP 
     Reji Ewing MD [facility]
THIS REPORT FOR:   //name//                          
 
                         [facility] ED
                                       
Test Date:    2020               Test Time:    13:00:59
Pat Name:     IRIS HAND           Department:   
Patient ID:   [id number]            Room:          
Gender:                               Technician:   [place]
:          1945               Requested By: Quentin Marrero
Order Number: [id number] MD:   Reji Ewing
                                 Measurements
Intervals                              Axis          
Rate:         62                       P:            -37
NM:           145                      QRS:          -77
QRSD:         125                      T:            34
QT:           457                                    
QTc:          464                                    
                           Interpretive Statements
Sinus rhythm
Multiple ventricular premature complexes
Right bundle branch block
Inferior infarct, old
Lateral leads are also involved
Compared to ECG 2020 14:11:22
Myocardial infarct finding still present
Electronically Signed On 2020 14:01:08 CDT by Reji Ewing
[email or web address]
 
 
 
 
 
 
 
 
 
 
 
 
  <ELECTRONICALLY SIGNED>
   By: Reji Ewing MD, FACC    
  20     1401
D: 20 1300                           _____________________________________
T: 20 1300                           Reji Ewing MD, [facility]      /EPI

## 2020-09-24 NOTE — HC
[facility]
[address]
[place]   [address]                     CONSULTATION                  
_______________________________________________________________________________
 
Name:       IRIS HAND      Room #:         446-P       ADM IN  
M.R.#:      [id number]                       Account #:      [id number]  
Admission:  09/23/20    Attend Phys:    David Hodges MD      
Discharge:              Date of Birth:  [birth year]  
                                                          Report #: [id number]
                                                                    [id number]   
_______________________________________________________________________________
THIS REPORT FOR:  
 
cc:  MYRTLE - No family physician/PCP 
     MYRTLE - No family physician/PCP                                        
     Tae Lima MD                                         ~
CC: [facility] physician/PCP
    David Hodges
 
DATE OF SERVICE:  09/24/2020
 
 
A 75-year-old male patient who was evaluated by me for altered mental status and
weakness and to determine any neurological etiology for the patient's altered
mental status and weakness.
 
I tried to call the patient's wife twice and even left a message for her to call
back and get hold of me, but she has neither responded to my call, nor she has
called back, so I can get hold of her.  I did talk to Dr. Hodges, the admitting
physician.  I reviewed the patient's records.  This patient is extremely
paranoid at the moment.  He thinks he is at home and I have broken into his
home.  He basically will not let me do anything on him even after talking to him
for some time.  All the records indicate that he fluctuates.  I reviewed other
people's records and it is about the same.  Review of the record indicates that
he has fluctuated and at one time, he was reasonably well and he had a pretty
extensive workup at least in the form of CT and in fact had a CT of the whole
spine.  He did not have MRI and it was canceled.  I do not know if there is any
contraindication for MRI or it was canceled because of patient being
uncooperative.  We need to reach the wife, but I have tried multiple times and I
have been unable to do that.
 
REVIEW OF SYSTEMS:  Positive for multiple problems including diabetes,
hypertension, hyperlipidemia and those have been summarized in the patient's
other consultants' note.  He also had a urinary tract infection and he had a
history of cardiac stent and bypass surgery.  He has a history of atrial
fibrillation and vitamin D deficiency as I understand this is all from the
record.  A 14-point review of system was attempted and this is as described
above.
 
PAST MEDICAL HISTORY:  Positive for weakness, but looks like he has become
worse, but does not look like there is any fall now.
 
FAMILY HISTORY:  Unavailable.  Hopefully, wife will call back and we can get
some more history.
 
SOCIAL HISTORY:  The patient is apparently , but I cannot reach the
patient's wife.
 
 
 
[facility]
[address] [place]   [address]                     CONSULTATION                  
_______________________________________________________________________________
 
Name:       IRIS HAND      Room #:         446-P       ADM IN  
M.R.#:      [id number]                       Account #:      [id number]  
Admission:  09/23/20    Attend Phys:    David Hodges MD      
Discharge:              Date of Birth:  [birth year]  
                                                          Report #: [id number]
                                                                    [id number]   
_______________________________________________________________________________
 
PHYSICAL EXAMINATION:  The patient's examination is pretty limited.  He will not
let me do anything I can.  I do not know whether he can move anything or how
much weak he is, he did not allow the reflected and then became very agitated
and abusive and he has been that way, even in the past according to the
psychiatry.  I can check his reflexes.
 
IMPRESSION:  I do not know what the etiology of the patient's symptoms is.  We
need to reach the wife.  He needs further workup.  If he is rapidly
deteriorating, he will ultimately need a spinal tap, but I need to find out if
there is a contraindication for doing the spinal tap and if he can do that. 
Even if he can do it, I am not sure how we are going to make him cooperate. 
Similarly, he may not be able to cooperate with the CT either because he is so
paranoid and we have to ask psychiatrist to calm him down first and then get
some more testing done.  In my view, he needs testing, but he will not.
 
Thank you very much for this referral, Dr. Peoples will follow up this patient
from the morning and I will try to make another effort to examine him later on
today.
 
 
 
 
 
 
 
 
 
 
 
 
 
 
 
 
 
 
 
 
 
 
 
 
 
  <ELECTRONICALLY SIGNED>
   By: Tae Lima MD         
  09/24/20     1843
D: 09/24/20 1334                           _____________________________________
T: 09/24/20 1524                           Tae Lima MD           /nt

## 2020-09-24 NOTE — NUR
ASSESSMENT: CM REVIEWED CHART AND MET WITH PATIENT. PT HAS HX OF DEMENTIA. CM
ALSO ATTEMPTED TO CONTACT PATIENTS WIFE X2 BUT UNABLE TO REACH AND VM WAS
LEFT. PER RECORDS PATIENT WAS RECENTLY ON OUR  [facility] AND
THEN DISCHARGED TO [facility] 9/5. PT WAS THEN DISCHARGED HOME FROM
THERE ABOUT 2-3 DAYS AGO WITH [facility]. CM RECEIVED A CALL FROM [facility] WHO VERIFIED HE IS ON SERVICES WITH THEM. PTS WIFE CALLED AMBULANCE AFTER
PATIENT HAS BEEN HOME BECAUSE SHE REPORTS HE HAS NOT BEEN ABLE TO GET OUT OF
BED FOR THE PAST 2 DAYS. PHYSICAL THERAPY IS WORKING WITH PATIENT AND
RECOMMENDING POST ACUTE CARE. CM SPOKE WITH PATIENT WHO REPORTS THAT HE HAS
BEEN TO [facility] BUT DOES  NOT THINK HE HAS BEEN TO A SNF. PTS COVID
TEST IS NEGATIVE. CM DISCUSSED RECOMMENDATION FOR A SNF STAY AND LEFT A SNF
LIST AT PATIENTS BEDSIDE. CM AGAIN ATTEMPTED TO REACH PATIENTS WIFE BUT UNABLE
TO AT THIS TIME. CM WILL CONTINUE TO FOLLOW TO ASSIST AS NEEDED.

## 2020-09-24 NOTE — NUR
2000 ASSUMED CARE OF PT AFTER REPORT FROM EWA, PT RESTING UN BED,
DISORIENTED TO PLACE AND TIME, ORIENTED TO SELF ONLY. 2045 ASSESSMENT
COMPLETED, PT RESTING IN BED, AND ASKING ABOUT HIS WIFE, ATTEMPTED TO CALL
FROM ROOM. PT USING BEDSIDE COMMODE FOR SMALL BM, CHING CATH IN PLACE AND
PATENT. FALL PRECAUTIONS IN PLACE, SCDS IN PLACE WILL CONTINUE TO MONITOR
0000 PT HAS BEEN CALLING 911 ASKING POLICE TO COME GET HIM, EXPLAINED TO PT
THAT IT IS TIME TO SLEEP AND HE IS IN THE HOSPITAL. WILL CONTINUE TO MONITOR

## 2020-09-25 NOTE — NUR
PT CARE ASSUMED AT 0700.A&O TO SELF. MRI CANCELLED PER DR. HENDRIX. PILLS WHOLE
WITH WATER. IV PATENT WITH NO REDNESS OR EDEMA, SALINE LOCKED. SON ALISHA IN
ROOM. CHING IN PLACE. ACHS. HALDOL SCHEDULED PER DR. CAMERON. FALL PROTOCOL IN
PLACE. CALL LIGHT IN REACH. PT IS SETUP. WILL CONTINUE TO MONITOR.

## 2020-09-25 NOTE — NUR
ASSESSED AT START OF SHIFT. PT IN BED RESTLESS AND YELLING OUT ALSO PULLING ON
FOLLEY. CALLED ONCALL RNP AND ONETIME PO HALDOL GIVEN. PT ALERT TO SELF ONLY
REORIENTED FREQUENTLY. PT STATES HIS AT HOME AND CONTINUOUSLY VOICES NEEDS TO
GETUP AND WALK. PT VERY WEAK AND ON BEDREST. DR MURPHY STOPPED BY TO SEE PT
NURSE BY BEDSIDE DURING ASSESSMENT PT WAS ABLE TO MOVE ALL EXTREMITIES. WIFES
NUMBER ON FILE INCORRECT WAS ABLE TO CONTACT HER [phone] AND SONS
NUMBER [phone]. MEDS GIVEN CRUSHED IN APPLE SAUCE. FALL PREC IN PLACE
AND FREQ ROUNDING DONE WILL CONT WITH POC TILL EOS.

## 2020-09-25 NOTE — NUR
SW reviewed chart and spoke with nursing and attending physician. Pt is
progressing toward goals for discharge. Therapy recommending post-acute care.
OVIDIO met with pt and son, Satnam, at bedside. Introduced role of SW. Pt is
alert/orientated. Pt states he feels that he would benefit from more rehab
before returning home. Pt is agreeable with referral to [facility]. Pt would need a level 2 assessment if going to a SNF. Pt was
recently on [facility] unit. Discharge planner to fax referral to [facility]. SW
notified [facility] liaisonLucrecia, of referral. SW is following to assist as needed
with discharge planning.

## 2020-09-25 NOTE — NUR
FAXED REFERRAL TO [facility] RECEIVED CONFIRMATION AND LEFT MSG FOR NAMRATA IN ADM. DP
TO FOLLOW.

## 2020-09-26 NOTE — NUR
PT CARE ASSUMED AT 0700. ALERT NOT ORIENTED. PT CONTINUES TO CALL OUT FOR HIS
WIFE RANDAL EVEN AFTER TALKING TO HER ON THE PHONE. PER DR. FARLEY PT IS TO BE
STRICT OUT OF BED DURING THE DAY. PER PT PT IS A 2 MAX ASSIST AND WILL BE
COMBATATIVE WHEN ATTEMPTING TO GET OUT OF BED. IV PATENT WITH NO REDNESS OR
EDEMA, SALINE LOCKED. IV ANTIBIOTICS. CHING IN PLACE THAT PT WILL TUG AT BUT
WILL STOP WITH REDIRECTION. BLOOD BLISTER ON SACRUM NOTED. Q2 TURNS. NO BM BUT
CONTINUES TO EXPRESS NEEDING TO HAVE A BM WITH ONLY SMEARS. PT WILL NOT PUSH
TO HAVE BM ONLY SIT ON BEDPAN. FALL PROTOCOL IN PLACE. CALL LIGHT IN REACH.
WILL CONTINUE TO MONITOR.

## 2020-09-26 NOTE — NUR
ASSUMED PT'S CARE THIS PM SHIFT. PT ORIENTED TO SELF. FORGETFUL. VSS ON RA. PT
TOOK MEDS PER EMAR, ONE PILL AT A TIME. PT CALLED [facility] FOR BM. HAD ONLY A SMEAR.
DARKSPOT LIKE PRESSURE ULCER NOTED ON PT. BARRIER CREAM APPLIED. FA
PRECAUTIONS IN PLACE. CALL LIGHT WITHIN REACH. PT NONCOMPLIANT WITH CALL
LIGHT. YELLS OUT. WILL CONTINUE TO MONITOR.

## 2020-09-27 NOTE — NUR
PT CARE ASSUMED AT 0700. ALERT AND NOT ORIENTED. PT CALLING OUT FROM THE ROOM
"DR PACE" BUT IS ABLE TO SETTLE AFTER REDIRECTING. PT IS MUCH CALMER WITH
SON IN THE ROOM. PER PT PATRICIA LIFT IS RECOMMENDED TO SIT PT UP IN THE
RECLINER. PER DR. MARTINE LEON OUT OF BED. MRI TOMORROW, CONSENT SIGNED, FAXED,
AND ON CHART. IV PATENT WITH NO REDNESS OR EDEMA, SALINE LOCKED, IV
ANTIBIOTICS. ACHS. Q2 TURNS. CHING IN PLACE. SET UP TRAY WITH ENCOURAGEMENT TO
DRINK. FALL PROTOCOL IN PLACE. CALL LIGHT IN REACH. SON IN THE ROOM AND LESS
AGITATED NOW. WILL CONTINUE TO MONITOR.

## 2020-09-27 NOTE — NUR
PT LYING IN BED.  DENIES PAIN.  RESTING COMFORTABLY.  NO NEEDS VOICED.  CALL
LIGHT WITHIN REACH.  FREQUENT OBSERVATION.

## 2020-09-28 NOTE — NUR
PT RESTING IN BED WAS GIVEN ATIVAN BEFORE MRI THIS WAS THIRD ATTEMPT AT MRI.
NEURO AND THERAPY SAW PATIENT TODAY. LOW AIR LOSS PUT ON BED.

## 2020-09-29 NOTE — NUR
ASSUMED PT CARE AT 1900.PT STILL SLEEPY FROM RECEIVING ATIVAN PRIOR T HIS MRI
BUT EASILY AROUSABLE.PRESSURE WOUND NOTED TO HIS R BUTTOCK,BARRIER CREAM
APPLIED,PT REPOSITIONED WHILE IN BED,AIR LOSS MATTRESS IN PLACE.PT REF TO EAT
OR DRINK ANYTHING SO FAR WHEN OFFERED TO HIM.CHING CATH IN PLACE WITH BLOOD
TINGED URINE NOTED IN THE BAG.HS MED HELD DUE TO PT NOT AWAKE ENOUGH T TAKE
THEM.FALL PRECAUTIONS IN PLACE.CALL LIGHT WITHIN REACH.

## 2020-09-29 NOTE — NUR
PT IS ORIENT WITH FORGETFULNESS, EXPERIENCING WEAKNESS WITH MAX ASSIST.  NURSE
DRESSED AND APPLIED CREAM TO TWO SMALL WOUNDS ON PT BOTTOM. PT IS ON
MECHANICAL ALTERED GROUND DIET. PT IS CONTINENT TO BOWEL WITH BEDPAN. PT HAD A
CT OF T-SPINE TODAY. FALL PRECAUTIONS IN PLACE, WILL CONTINUE TO MONITOR.

## 2020-09-29 NOTE — HC
[facility]
Mauri [address]gs
[place]   [address]                     CONSULTATION                  
_______________________________________________________________________________
 
Name:       IRIS HAND      Room #:         446-P       ADM IN  
M.R.#:      [id number]                       Account #:      [id number]  
Admission:  09/23/20    Attend Phys:    David Hodges MD      
Discharge:              Date of Birth:  [birth year]  
                                                          Report #: [id number]
                                                                    [id number]   
_______________________________________________________________________________
THIS REPORT FOR:  
 
cc:  FAM - No family physician/PCP 
     FAM - No family physician/PCP                                        
     Bala Baeza MD                                          ~
CC: [facility] physician/PCP
    David Hodges
 
DATE OF SERVICE:  09/27/2020
 
 
WOUND CARE CONSULTATION NOTE
 
REASON FOR CONSULTATION:  Sacral pressure sore.
 
HISTORY OF PRESENT ILLNESS:  The patient is a 75-year-old gentleman admitted to
[facility] on 09/23/2020 for failure to thrive at home with
immobility and unable to be cared for at home.  He has a history of dementia
with behaviors and encephalopathy.  The patient pulled on his Larson other day
and has had hematuria requiring irrigation.  Nurse noted some sacral pressure
sores.  Wound Care is consulted.
 
PAST MEDICAL HISTORY:  Acute encephalopathy, agitation due to dementia,
immobility,  history of coronary artery disease, hematuria from pulling out
Larson, urinary tract infection, and weakness.
 
ALLERGIES:  To ZIPRASIDONE.
 
MEDICATIONS:  Include Banophen, Lipitor, Depakote, Haldol, Amaryl,
cyanocobalamin, Cozaar, Glucophage, Coreg, Xarelto, vitamin D, Centrum, and
Bashir.
 
PAST MEDICAL HISTORY:  Diabetes mellitus type 2, coronary artery disease,
malnutrition, peripheral neuropathy, and spinal stenosis.
 
REVIEW OF SYSTEMS:  Not obtainable.  Fecal incontinence.
 
PHYSICAL EXAMINATION:
GENERAL:  Shows a debilitated elderly gentleman who is not a good historian. 
His son is present.
HEENT:  Mucous membranes are moist.
NECK:  Supple.
ABDOMEN:  Soft.
EXTREMITIES:  No lower extremity wounds.  Examination of the patient's back
shows reddish discoloration of the sacral area with small area of blistering and
a small stage II sacral pressure sore.  This is a stage 2 pressure ulcer.
 
 
 
[address]   [address]                     CONSULTATION                  
_______________________________________________________________________________
 
Name:       IRIS HAND [facility]     Room #:         446-P       [facility] [place]  
.R.#:      [id number]                       Account #:      [id number]  
Admission:  09/23/20    Attend Phys:    David Hodges MD      
Discharge:              Date of Birth:  [birth year]  
                                                          Report #: [id number]
                                                                    [id number]   
_______________________________________________________________________________
 
IMPRESSION:
1.  Dementia.
2.  Immobility.
3.  Fecal incontinence.
4.  Diabetes mellitus 2 with skin ulcer.
5.  Hematuria.
6.  Sacral stage 2 pressure sore from prolonged bed rest.
 
PLAN:  Order low air loss mattress and twice daily barrier cream.  Wound care
team will follow.
 
 
 
 
 
 
 
 
 
 
 
 
 
 
 
 
 
 
 
 
 
 
 
 
 
 
 
 
 
 
 
 
 
  <ELECTRONICALLY SIGNED>
   By: Bala Baeza MD          
  09/29/20     0907
D: 09/27/20 1816                           _____________________________________
T: 09/28/20 0031                           Bala Baeza MD            /nt

## 2020-09-29 NOTE — NUR
I AGREE WITH NURSING ASSESSMENT AND NURSING NOTE DONE BY DG/SURY. THIS
RN UPDATED SON/OMID IN REGARD TO HIS FATHER'S CONDITION, SON/PATRICK WOULD
LIKE TO TALK TO THE NEUROLOGIST IN REGARD TO THE NEXT PLAN OF TREATMENT.

## 2020-09-29 NOTE — NUR
PT IS ORIENT OT SELF, AND PLACE, BEDREST, VSS. PT IS GOING FOR A CT OF THE
T-SPINE. PT HAS IS ON A MECHANICAL ALTERED, HAS A CHING, URINE IS DARK RED,AND
IS CONTINENT TO BOWEL, USING A BEDPAN. PT TAKES MEDS WHOLE ONE AT A TIME. PT
IS A TOTAL FEED. FALL PRECAUTIONS ARE IN PLACE, WILL CONTINUE TO MONITOR.

## 2020-09-30 NOTE — NUR
DC planning on hold pending imput from the care team/ortho/neurosurgery
consults. Message rec'd from son Nikolay this morning. Attempted to reach him
and pt's spouse this afternoon. Message left on Frank cell number. Acute
rehab referrals to [facility] and [facility] declined unless pt has new dx or surgery. SNF
may be best option (level II) would be needed d/t recent [facility] stay. Awaiting
imput on plan of care to guide dc planning efforts. Will follow.

## 2020-09-30 NOTE — NUR
ASSUME CARE AT 0715.  PT IS MORE ALERT TODAY AND EATING BETTER, WOUNDS CLEANED
AND DRESSED, SCD'S IN PLACE. PATIENT IS CALLING FOR WIFE FROM TIME TO TIME.
FALL PRECAUTIONS IN PLACE, WILL CONTINUE TO MONITOR.

## 2020-09-30 NOTE — NUR
PT CARE ASSUMED IWTH PT IN BED.PT IS ALERT AND ORIENTED X2.PT HAS A CHING
CATHETER AND WOUND ON BUTTOM WITH BORDER FOAM DRESSING.PT IS MAX ASSIST X2
AND Q2 REPOSITION.PT IS ACCUCHECK ACHS WITH MODERATE DOSE SSI.WILL CONTINUE TO
MONITOR

## 2020-09-30 NOTE — NUR
FAXED REFERRAL TO [facility] RECEIVED CONFIRMATION AND SP0KE WITH NAMRATA IN ADM SHE
WILL REVIEW,

## 2020-10-01 NOTE — EEG
[facility]
Mauri [address]gs
[place]  [address]                      ELECTROENCEPHALOGRAM          
_______________________________________________________________________________
 
Name:       IRIS HAND       Room #:         446-P       ADM IN  
M.R.#:      [id number]      Account #:      [id number]  
Admission:  09/23/20     Attend Phys:    David Hodges MD     
Discharge:               Date of Birth:  [birth year]  
                                                           Report #: [id number]
    [id number]  
_______________________________________________________________________________
THIS REPORT FOR:   //name//                          
 
CC: [facility] physician/PCP
    David Hodges
 
DATE OF SERVICE:  09/25/2020
 
 
This patient is being evaluated for the possibility of encephalopathy.
 
EEG was done by placing the electrode by standard 10-20 system of electrode
placement.  The background activity is poorly formed and disorganized and it
appeared to be about 7-8 Hz and 30 microvolt.  Photic stimulation is
unremarkable.  The patient became drowsy and that is associated with bilateral
slowing and vertex sharp waves.  Throughout the record, no active epileptiform
activity was noticed.
 
IMPRESSION:  This patient's EEG is disorganized and poorly formed.  That is a
nonspecific abnormality, which can occur with encephalopathy, dementia, and
effect of psychotropic medication.
 
Thank you very much for this referral.
 
 
 
 
 
 
 
 
 
 
 
 
 
 
 
 
 
 
 
 
 
 
       <ELECTRONICALLY SIGNED>
   By: Tae Lima MD         
  10/01/20     1035
D: 09/28/20 1157                           _____________________________________
T: 09/28/20 1209                           Tae Lima MD           /nt

## 2020-10-01 NOTE — NUR
Case discussed with the care team. Writer spoke with pt's son Stiven and dc
planning efforts are on hold pending spinal surgery decision. Family is
awaiting f/u call from Dr. Diaz with recommendations. [facility] and [facility] would
consider re evaluating the pt if he has surgery. SNF listing emailed to pt's
son as a back up plan. Will follow.

## 2020-10-01 NOTE — NUR
PT WAS LYING IN BED WITH HIS EYES CLOSED AT THE START OF SHIFT.PT DENIED PAIN
SO FAR.CHING CATH TO DD WITH DARK YELLOW URINE NOTED.PT REPOSITIONED WHILE IN
BED.WOUND TO HIS R BUTTOCK CLEANED WITH SALINE,BARRIER CREAM APPLIED.PT HAD A
SMALL BM THIS AM.PT AT RISK FOR ASPIRATION.HOB ELEVATED.PT AGITATED THIS
AM,PRN MED ADMINISTERED,EFFECTIVE.PT SLEEPING AT THIS TIME.CALL LIGHT WITHIN
REACH.

## 2020-10-01 NOTE — NUR
PT IS ORIENT TO SELF AND PERSON WITH CONFUSION, VSS. PT IS EATING WELL AND
CONTINENT TO BOWEL. PT HAS A CHING. FALL PRECAUTIONS IN PLACE, WILL CONTINUE
TO MONITOR.

## 2020-10-02 NOTE — NUR
PT CARE ASSUMED WITH PT IN BED WATCHING TV.PT WAS ALERT AND ORIENTED X1.PT IS
BEDREST WITH MAX ASSIST X2.PT IS ACCUCHECKS ACHS .IV ACCESS ON LT AC .PT HAS A
CHING CATHETER IN PLACE.DRESSING ON BOTTOM CHANGED.PT IS Q2 REPOSITIONING.PT
APPEARED TO BE IN NO DISTRESS.WILL CONTINUE TO MONITOR POC

## 2020-10-02 NOTE — HC
[facility]
[place]aur[place]   [address]                     CONSULTATION                  
_______________________________________________________________________________
 
Name:       IRIS HAND      Room #:         446-P       ADM IN  
M.R.#:      [id number]                       Account #:      [id number]  
Admission:  09/23/20    Attend Phys:    David Hodges MD      
Discharge:              Date of Birth:  [birth year]  
                                                          Report #: [id number]
                                                                    [id number]   
_______________________________________________________________________________
THIS REPORT FOR:  
 
cc:  MYRTLE - No family physician/PCP 
     MYRTLE - No family physician/PCP                                        
     Omer Diaz MD                                          ~
CC: [facility] physician/PCP
    David Hodges
 
DATE OF SERVICE:  09/30/2020
 
 
HISTORY OF PRESENT ILLNESS:  The patient is a 75-year-old gentleman who has
progressively lost the ability to ambulate.  He also believes he has lost some
arm strength and dexterity.  The patient is no longer ambulatory.  He is
incontinent of bowel and bladder.  He denies fever, chills or sweats.  The
patient is known to have dementia and the history is somewhat difficult to
obtain.
 
PHYSICAL EXAMINATION:  The patient's exam is somewhat confusing.  The patient
demonstrates 2+ to 3 upper extremity reflexes, biceps, triceps and
brachioradialis.  The patient's  strength was grade 4+.  His wrist extensors
5, wrist flexors 5, biceps were grade 5/5.  His left triceps was grade 5 and his
right triceps was grade 4.  The patient denied sensory change in the upper
extremity, but this was difficult to elicit.  With respect to the lower
extremities, the patient's legs were scissored and rigid.  The patient
demonstrated 3 reflexes that were 3 at the knees, 3 at the ankles.  His plantars
were actually neutral.  He demonstrated no clonus.  The patient's motor strength
was difficult to elicit beyond the EHLs, which were grade 4 bilaterally.  The
patient's rigidity allowed very little motion.  Sensory, I could not obtain a
coherent response to sensory examination.
 
IMAGING:  X-rays showed degenerative changes, but relatively good alignment. 
The patient's MRI was reviewed and showed cervical stenosis with a canal
measuring 8.5 mm at C3-C4, 8 mm at C5-C6 and 7.5 mm at C6-C7.  I did not discern
internal cord signal or myelomalacia.
 
ASSESSMENT AND PLAN:  The patient seemed to demonstrate at least upper extremity
radiculopathy in the form of right C7.  The patient's lower extremities were
consistent with rigidity and difficulty defining strength as there was very
little motion available other than testing of the EHLs, which were decidedly
weak and grade 4.  The patient's sensory exam was impossible to elicit
subjectively today on questioning.  The patient's other problems include
hypertension, parkinsonism, coronary artery disease, status post CABG and stent
placement, diabetes, acute encephalopathy, agitation due to dementia, atypical
chest pain, delusions, falls, psychosis, urinary tract infections, fecal
incontinence, stage 3 sacral skin ulcer, atrial fibrillation, currently being
 
 
 
[place] [address]                     CONSULTATION                  
_______________________________________________________________________________
 
Name:       IRIS HAND      Room #:         446-P       [facility] [place]  
M.R.#:      [id number]                       Account #:      [id number]  
Admission:  09/23/20    Attend Phys:    David Hodges MD      
Discharge:              Date of Birth:  [birth year]  
                                                          Report #: [id number]
                                                                    [id number]   
_______________________________________________________________________________
treated on Xarelto, on Coreg, malnutrition with a low albumin, gallbladder
disease and pleural effusions accompanying basilar pulmonary atelectasis.  The
patient has moderate cervical spinal stenosis.  This could certainly be
contributing to his upper and lower extremity findings in addition to
Parkinsonism.  The patient was advised of his condition with an explanation as
much as could be comprehended.  The patient stated under no circumstance was he
interested or would allow surgical intervention.  Therefore, the only other
treatment that might provide some benefit is cervical epidural steroid.  I will
continue to follow the patient, but at this point other than surgery at the
C5-C6, C6-C7 level, I have very little else to offer the patient and he presents
with multiple diagnoses that represent significant risk factors for an
uncomplicated surgery.  He would be expected to have at least some complication
postoperatively, particularly in light of his heart and lung status, recent
urinary tract infection, sacral ulcers that could potentially contaminate a
surgical wound site with bacteremia.  In any case, I will follow him, be more
than happy to discuss at any point his care in the future with any members of
the healthcare team.
 
 
 
 
 
 
 
 
 
 
 
 
 
 
 
 
 
 
 
 
 
 
 
 
 
 
 
  <ELECTRONICALLY SIGNED>
   By: Omer Diaz MD          
  10/02/20     1533
D: 09/30/20 1145                           _____________________________________
T: 09/30/20 1346                           Omer Diaz MD            /nt

## 2020-10-03 NOTE — NUR
Assumed care of patient at 0700. Pt. is confused and does not seem oriented or
aware of his surroundings. He complained that the TV was broken and that he
wanted his clothes put on (both were woring correctly). Physician ordered
removal of the catheter and this order was carried out by RN. Pt. looked to
become increasingly anxious so medication was given via physician. Fall
precautions in place.

## 2020-10-03 NOTE — NUR
PT SLEEPING MOST OF THE NOC. WAKES UP TO TAKE MEDS WITH NO DIFFICULTY. APPEARS
TO BE IN NO DISTRESS. ON ROOM AIR, NO SOA OR COUGH. SMALL BM NOTED. ZGUARD
APPLIED TO BUTTOCKS.REPOSITIONING PROVIDED.HUMZA TO D/D WITH GOOD U/O.

## 2020-10-04 NOTE — NUR
PT LYING IN BED.  DENIES PAIN.  INCONTINENT.  RESTING COMFORTABLY.  NO NEEDS
VOICED.  CALL LIGHT WITHIN REACH.  FREQUENT OBSERVATION.

## 2020-10-04 NOTE — NUR
PT HAS RESTED IN BED REFUSED CARE AT TIMES. CALLED THIS NURSE A BITCH AND
THREW WATER ON THE FLOOR. B/P WAS LOW IV BOLUS ORDERED BY DOCTOR. BLOOD SUGARS
MONITORED AND S/S AS ORDERED. PT INCONT OF B/B SON HERE TO VISIT.

## 2020-10-05 NOTE — NUR
ON-GOING ASSESSMENT: CM REVIEWED CHART AND RECEIVED A CALL FROM PATIENTS SON
SATYA. THEY ARE INTERESTED IN [facility]. DWAYNE SPOKE WITH OVIDIO
WHO REPORTS THEY HAD SPOKEN WITH [facility] ON FRIDAY AND THEY ARE WANTING UPDATED
THERApy NOTES ON PATIENT TO SEE HOW HE IS PARTICIPATING. CM NOTIFIED PHYSICAL
THERAPY TO PLEASE SEE PATIENT SO CM CAN SEND THOSE EVALS. CM FAXED UPDATED
CLINICAL TO [facility] AND CURRENTLY AWAITING PT EVAL TO
SEND AND FURTHER INPUT FROM [facility]. DWAYNE SPOKE WITH BRITTANIE AT [facility] TO UPDATE. CM
WILL CONTINUE TO FOLLOW.

## 2020-10-05 NOTE — NUR
PT IS ORIENT TO SELF, INCONTINENT OR BOWEL AND BLADDER. Z-GAURD WAS USED FOR
PT WOUND ON BUTTOCKS. PT IS IN FAIR MOOD TODAY. PT AT VERY LITTLE TODAY. FALL
PRECAUTION IN PLACE, WILL CONTINUE TO MONITOR.

## 2020-10-05 NOTE — NUR
PT LYING IN BED.  INCONTINENT.  NO PAIN.  RESTING COMFORTABLY.  NO NEEDS
VOICED.  CALL LIGHT WITHIN REACH.  FREQUENT OBSERVATION.

## 2020-10-06 NOTE — NUR
PT AOX1, TO SELF, RESPONDING APPROPRIATELY TO SOME PROMPTS NOTED TO RESPOND TO
INTERNAL STIMULI. PT DENIES PAIN AND SOB, FLACC OF 0. PT TOLERATING PO INTAKE
OF THIN FLUIDS WITH MECHANICALLY ALTERED-GROUND DIET, PO MEDICATIONS CRUSHED
WITH PUDDING, TOLERATED WELL. FREQUENT REPOSITIONING ENCOURAGED. PT
TRANSFERRED TO AIR LOSS MATTRESS WITH MAX ASSIST, MATTRESS MAX INFLATED WITH
TURN ASSIST. PT WITH PRESSURE SORE TO SACRAL AREA AND SCROTUM, ZGUAURD
APPLIED. PT NOTED TO BECOME COMBATIVE DURING TRANSFER AND PERICARE, RESPONDING
TO INTERNAL STIMULI, DIFFICULT TO CONSOLE. ONCE TRANSFER AND PERICARE
COMPLETED, PT NOTED TO CALM DOWN. ENVIRONMENTAL STIMULI DECREASED. PT NOTED TO
FALL ASLEEP WITH DECREASED ENVIRONMENTAL STIMULI. PT ROOM REMAINS NEAR NURSES
STATION, BED ALARM ON, BED IN LOWEST POSITION, FREQUENT MONITORING WILL
CONTINUE.

## 2020-10-06 NOTE — NUR
on-going assessment: CM REVIEWED CHART AND RECEIVED A CALL THIS AM FROM BRITTANIE
AT [facility] WHO REPORTS THEIR MEDICAL DIRECTOR REVIEWED
AND THEY CANNOT ACCEPT PATIENT AND THINK HE IS MORE APPROPRIATE FOR SNF AS
THEY DO NOT FEEL HE CAN TOLERATE ACUTE REHAB THERAPY PER DAY.  CM NOTIFIED
PATIENTS SON SATYA AND DISCUSSED. THEY REVIEWED SNF LIST AND WANTED A REFERRAL
SENT TO [facility]. CM FAXED REFERRAL ALONG WITH NEGATIVE
COVID TEST. CM NOTIFIED LIASON PATIENT HAS [facility] A FEW MONTHS AGO BUT SON REPORTS THAT IS HIS ONLY INPATIENT PSYCH STAY
IN THE PAST TWO YEARS. CM WILL AWAIT FURTHER INPUT FROM [facility].

## 2020-10-06 NOTE — NUR
on-going assessment: CM REVIEWED CHART AND SPOKE WITH ATTENDING. ATTENDING
STATES SHE IS DOING TO SEE IF PATIENT QUALIFIES TO GO TO [facility] UNIT. CM WILL
CONTINUE TO FOLLOW TO ASSIST AS NEEDED.
REFERRAL HAD ALSO BEEN SENT TO [facility].

## 2020-10-06 NOTE — NUR
Assumed pt care at 7am.Assessment completed.Vss.Pt was very agitated,combative
and confused early this shift hitting and cursing both rn and doctor.Dr Rosado
aware and order noted.Rn assisted pt with breakfast but only took few bites of
apple sauce and cream of wheat.St eval done and diet changed to pureed.Pt son
here later this evening,updates given.Dr Carter rounded on pt and wanted to
updates pt's son but after paged twice overhead without response,pt son left.
Pt has moderate bm this evening.Pericare done and foam drsg applied to sacral
wound.Pt only drank 100% ensure at dinner.Fall bundle in place. Report off to
noc rn.
 
confused early this shift

## 2020-10-07 NOTE — NUR
PT MOVED  AT 1730. REPORT RECEIVED FROM BIANKA BATISTA. PT A/O X 2,
FORGETFUL, PT ROTATED, CHUX PAD CHANGED AND Z GUARD APPLIED. FAMILY UPDATED
AND HAS SPOKEN WITH PT ON THE PHONE AS WELL. VSS. WILL CONT TO MONITOR.

## 2020-10-07 NOTE — NUR
PT ALERT AND ORIENTED TIMES THREE WITH PERIODS OF CONFUSION, PT ALSO HAS A
BLUNTED AFFECT SOME PARTS OF THE SHIFT. VSS. PT DENIES PAIN/SOA. PT ATE SMALL
PORTIONS OF MEALS THIS SHIFT. SPOKE WITH PT SON TO UPDATE ON CARE. PT SLOWLY
PROGRESSING [facility] POC GOALS.

## 2020-10-07 NOTE — NUR
on-going assessment: CM REVIEWED CHART AND SPOKE WITH ATTENDING. PT IS HAVING
A CT OF THE HEAD TODAY. PT WAS NOT ABLE TO PARTICIPATE IN THERAPY WITH PT/[facility] IS FOLLOWING BUT UNSURE THEY ARE ABLETO ACCEPT PATIENT AS HE IS
NOT PARTICIPATING IN THERAPIES. CM SPOKE WITH PATIENTS SON SATYA TO UPDATE. CM
WILL CONTINUE TO FOLLOW TO ASSIST NEEDED.

## 2020-10-07 NOTE — NUR
ASSUMED PT CARE AT 1900.PT WAS OBSERVED LYING IN NBED WATCHING TV.PT
REPOSITIONED WHILE IN BED.PT HAS A SMALL BM THIS SHIFT,PERICARE DONE BARRIER
CREAM APPLIED.SACRAL WOUND DRSG DONE.REDNESS NOTED TO PT'S SCROTUM,NETTIE CARE
AND BARRIER CREAM APPLIED.BRUISING NOTEDTO HIS SRIRAM UPPER EXT.PT RESTING ON HIS
AIR LOSS MATTESS BED.FALL PRECAUTIONS IN PLACE,CALL LIGHT WITHIN REACH.

## 2020-10-08 NOTE — NUR
ASSUMED CARE OF PT AT 1900HRS. PT AOX1-2 AND NEEDS MUST BE ANTICIPATED. FALL
PRECAUTION IN PLACE. ASSESSMENT CHARTED. PT TOOK MEDS WHOLE WITH WATER.
UNABLE TO OBTAIN UA AS PT IS INCT. PT DENIES PAIN, NAUSEA OR SOA. PT WAS ABLE
TO GET COMFORTABLE AND SLEEP PART OF THE SHIFT. VSS AND NO S/S OF ACUTE
DISTRESS. WILL CONTINUE TO MONITOR.

## 2020-10-08 NOTE — NUR
PATIENT DROWSY BUT AWAKENS AND IS ORIENTED, VERY FLAT AFFECT AND WHISPERS
ANSWERS TO QNS AND IRRITABLE AT TIMES. VITALS STABLE AND DENIES PAIN. UNABLE
TO GET UP TO THE CHAIR AT THIS TIME. UA ORDERED AND PATIENT UNABLE TO VOID,
ORDER RECEIVED TO STRAIGHT CATH. PATIENT BLADDER SCANNED AND STRAIGHT CATHED
BY NURSING INSTRUCTOR AND STUDENT WITH RESULTS OF 2100ML. KRYPTO TEXT SENT TO
DR. HENDRIX AND KISHOR AND SCHEDULED BLADDER SCAN ORDERED.

## 2020-10-09 NOTE — NUR
The son of the patient, Jay, asked to talk to the doctor about the patient,
Dr. Elie meng, awaiting for response. Son number: [phone].

## 2020-10-09 NOTE — NUR
ASSUMED CARE OF PT AT 1900HRS. PT AOX1-2 AND NEEDS MUST BE ANTICIPATED. FALL
PRECAUTION IN PLACE. ASSESSMENT CHARTED. PT WAS BLADDER SCANNED Q6H. PT IS
INCT. PT TOOK MEDS WHOLE WITH WATER. PT WAS ABLE TO GET COMFORTABLE AND SLEEP
PART OF THE SHIFT. VSS AND NO S/S OF ACUTE DISTRESS. WILL CONTINUE TO MONITOR.

## 2020-10-09 NOTE — NUR
Patient bladder scanned around 6:40 pm, 23ml; The patient has not eaten or
drunk anything during the whole shift; lying in bed, chest up and down, opened
eyed and moaned when being turned, voiced" No, No" when being cleaned; had two
bowlments during the shift. Dr. Delgadillo asked to hold psychologic medication
and claimed he would give fluid order. will pass it on to the night nurse.

## 2020-10-09 NOTE — NUR
Patient has been lying in bed, with eyes closed, chest up and down, opened
eyes when the staff touched his body, no answer to questions. morning
medication held, Dr. Hodges is aware.

## 2020-10-10 NOTE — NUR
Pt. has been lethargic most of the shift when checked on during rounds.  He
will moan outloud when doing aimee care or turning him.  Bed alarm is on.

## 2020-10-10 NOTE — NUR
PT CARE ASSUMED AT 0700. HARD TO AROUSE AND LETHARGIC. LUNGS CLEAR AND
DIMINISHED. PT REFUSING TO EAT OR DRINK ALL DAY. PO MEDS HELD IN TH EAM DUE TO
BEING TOO SEDATED. ORDERS GIVEN FROM  TO GIVE LACTULOSE RECTALLY.
ACHS. INSULIN HELD DUE TO PT REFUSING ALL HIS MEALS. CHING INSERTED DUE TO
RETENTION 650ML OUT. TOTAL CARE. Q2 TURNS. SPECIALTY BED. ON RA. BP AT 1700
170/68,  INFORMED. PT MORE AWAKE AT 1400 AND VERBALLY AGRESSIVE
BEHAVIORS. PT MORE CALM AT 1500. FLUIDS OFFERED. PT DRANK ONE CUP ORANGE JUICE
AND ONE CUP 120ML OF ICE WATER. PT REFUSING PO MEDS. DR. HENDRIX AWARE. NO
FURTHER ORDERS AT THIS TIME ON ELEVATED BP. IV PATENT WITH NO REDNESS OR
EDEMA, FLUIDS INFUSING. BED BATH GIVEN. PT STRUGGLING TO HAVE BM. DIGITALLY BM
REMOVED. PT HAS HEMORRHOIDS. MINIMAL BLEEDING. WOUND ON SACRUM NECTROTIC,
PICTUREES TAKEN. MD INFORMED. ZGUARD APPLIED. SCROTUM RED, ZGUARD APPLIED.
FALL PROTOCOL IN PLACE. WIFE CALLED AND SPOKE WITH PT ON THE PHONE TODAY. CALL
LIGHT IN REACH. HAND OFF REPORT GIVEN TO BIANKA ROSARIO.

## 2020-10-11 NOTE — NUR
Pt. has been awake about 80% of the shift.  He will answer simple yes and no
questions.  He has refused anything po when offered frequently during the
shift.  He has been turned and repositioned.  Wound care given to scrotom
and buttocks during aimee care.  Bed alarm is on.

## 2020-10-11 NOTE — NUR
ASSUMED PT CARE THIS AM. PT ORIENTED TO PERSON, CONFUSION NOTED THROUGHOUT THE
SHIFT. REORIENTATION ATTEMPTED. PT VITAL SIGNS STABLE. CHING CATHETER IN
PLACE, PATENT. PT TOOK MEDICATIONS CRUSHED, REFUSED MEDS AFTER A WHILE. PT
REFUSED EATING FOOD, INSULIN HELD DUE TO THIS. PT ON LOW AIRLOSS BED. PT ABLE
TO BE REPOSITIONED Q2H. PT REPEATED SELF WITH STORIES, GOT AGITATED WHEN HE
WOULD BECOME CONFUSED. ENDORSED TO NIGHT NURSE.

## 2020-10-12 NOTE — NUR
ON-GOING ASSESSMENT: CM REVIEWED CHART AND SPOKE WITH ATTENDING. PT REMAINS
LETHARGIC AND NOT WANTING TO TAKE MEDICATIONS OR PARTICIPATE MUCH WITH
THERAPY. CM SPOKE WITH CANDIS GLEASON AT [facility] TO UPDATE AND SHE IS
REQUESTING UPDATED CLINICAL TO BE FAXED TO THEM. THEY ARE STILL REVIEWING IF
THEY CAN ACCEPT PATIENT AND WILL NEED HIM TO BE COOPERATIVE AND PARTICIPATE.
CM FAXED UPDATED CLINICAL TO [facility] [place].
CM WILL CONTINUE TO FOLLOW TO ASSIST AS NEEDED.

## 2020-10-12 NOTE — NUR
PT ARRIVED ON UNIT AS A 4W TRANSFER AT 0000.  RESTING COMFORTABLY.  NO NEEDS
VOICED.  FREQUENT OBSERVATION

## 2020-10-12 NOTE — NUR
ASSUMED CARE AT 0700, PT CONFUSED AND REFUSING TO TAKE ALL ORAL MEDS. PT
REFUSED TO EAT BUT WILL DRINK SUPPLEMENT WITH SOME ENCOURAGEMENT. DR. HENDRIX
WAS INFORMED OF PT REFUSAL TO TAKE MEDS. HELD S/S INSULIN D/T REFUSAL TO EAT.
DR. HENDRIX INFORMED. PT TURNED Q 2HRS. PT HAS LOOSE STOOLS, WOUND ON LEFT
BUTTOCK CLEANED AND BARRIER CREAM APPLIED. FALL PRECAUTIONS IN PLACE, IV FA IS
PATENT WITH FLUIDS RUNNING. CALL LIGHT IN REACH, WILL CONTINUE TO MONITOR.

## 2020-10-12 NOTE — NUR
Pt has been eating poorly past month.  Refusing oral supplements.
Hypernatremia.  Consider change IVF to clinimix PPN at 100ml/hr with 250ml 20%
lipids.  ? need for tube feed if pt does not start eating.

## 2020-10-12 NOTE — NUR
ASSUMED CARE OF PT AT 1900HRS. PT IS AOX1-2 AND NEEDS MUST BE ANTICIPATED.
FALL PRECAUTION IN PLACE. PT DENIED PAIN, NAUSEA OR SOA. ASSESSMENT CHARTED.
PT TOOK ALL HS MEDS. IVF CONTINUED. CHING IN PLACE AND IS PATIENT. PT WAS
TRANSFERRED TO [facility] AT 0000HRS IN STABLE CONDITION. REPORT GIVEN TO KELSEY CARLTON.

## 2020-10-13 NOTE — NUR
ASSESSED AT START OF SHIFT 1900. PT A&OX1 TO SELF. IV INTACT WITH IVF. PER
REPORT PT HAS POOR APPETITE DIDN'T EAT DINNER. BSG CHECKED AND LANTUS GIVEN.
EVENING MEDS GIVEN CRUSHED WITH APPLE SAUCE PT ABLE TO EAT 3 SPOONS FULL OF
SAUCE. REPOSITIONED FOR COMFORT. FOLLEY INTACT, PT INCONTINENT OF
BOWEL. PERICARE PROVIDED AND BARRIER CREAM APPLIED. FALL PREC IN PLACE AND
HOURLY ROUNDING DONE WILL CONT WITH POC TILL EOS.

## 2020-10-13 NOTE — NUR
PT IS AOX1/COMBATIVE AT TIMES. PT REFUSED TO OPEN MOUTH FOR MEALS/MEDS DR WAS
INFORMED. S/S INSULIN HELD, PT YELLS LEAVE ME ALONE. PT IS INCONTINENT OF
BOWL, PERICARE PROVIDED. PT TURNED Q2HRS. PT ENCOURAGED TO DRINK SIPS OF WATER
BUT REFUSED.  IV RIGHT FA IS PATENT WITH FLUIDS RUNNING. FALL PRECAUTIONS IN
PLACE, WILL CONTINUE TO MONITOR.

## 2020-10-14 NOTE — NUR
ON-GOING ASSESSMENT: CM REVIEWED CHART AND SPOKE WITH ATTENDING. CM SPOKE WITH
BEDSIDE RN WHO REPORTS PT IS NOT TAKING HIS MEDICATIONS AND REFUSING TO
EAT/DRINK. SON HAS BEEN AT THE BEDSIDE. CM DISCUSSED WITH ATTENDING. PSYCH
SAW PATIENT TODAY. CM WORKING WITH [facility] AS POSSIBLE PLACEMENT BUT
THEY HAVE NOT OFFICIALLY ACCEPTED PATIENT AND NEED HIM TO BE PARTICIPATING.
THEY ARE CONCERNED PATIENT WILL NOT PARTICIPATE ONCE THERE. CM CONTINUING TO
FAX THEM UPDATES. CM DISCUSSED WITH PATIENT SON SATYA AND HE REQUEST PATIENTS
WIFE BE ABLE TO VISIT AGAIN. CM RECEIVED APPROVAL FOR THIS FROM UNIT MANAGER
AND BEDSIDE RN AWARE. PLANS ARE FOR PATIENTS WIFE TO COME VISIT HIM TOMORROW
TO SEE IF HE IS MORE COOPERATIVE. CM ALSO DISCUSSED POSSIBLE NEED FOR LTC
PLACEMENT IF PT IS NOT ABLE TO GO TO SNF AND HE REPORTS THEY WOULD LIKEYL GO
TO [facility] IF THEY WOULD ACCEPT. CM WILL CONTINUE TO FOLLOW TO ASSIST
AS NEEDED.

## 2020-10-14 NOTE — NUR
ASSESSED AT START OF SHIFT. PT ALERT TO SELF ABLE TO ANSWER YES OR NO
QUESTIONS. EVENING MEDS GIVEN CRUSHED WITH APPLE SAUCE. PT CONSTANTINO TWO SPOONS OF
APPLE SAUCE WITH FEW SIPS OF WATER. POOR APPETITE. IV INTACT WITH IVF. PT
REPOSITIONED FOR COMFORT. INCONTINENT OF BOWEL. NETTIE CARE PROVIDED AND BARRIER
CREAM APPLIED.  PT ON A LOW AIR LOS MATRESS FOR COMFORT. FALL PREC IN PLACE
AND WILL CONT WITH POC TILL EOS.

## 2020-10-15 NOTE — NUR
ASSUMED PT CARE AT 0715. PT IS CONFUSED, AND FORGETFUL. PT IS ALERT TIMES TWO.
PT HAD A LARGE LOOSE BM. PT HAS A WOUND  WITH SEROUS SEROSANGUINEOUS DRAINAGE,
WITH SLOUGHING. DAKIN'S WAS APPLIED TO THE WOULD WITH 4X4 GAUZE, ABD AND TAPE.
PT IS BEING REPOSITIONED WITH Q2T. PT TOOK MEDS APPROPRIATELY TODAY. FALL
PRECAUTIONS IN PLACE, WILL CONTINUE TO MONITOR.

## 2020-10-15 NOTE — NUR
PT ASSESSED AT START OF SHIFT. MORE ALERT& AWAKE THIS EVENING. REPOSITIONED
FOR COMFORT AND EVENING MEDS GIVEN WITH APPLE SAUCE. PT STATED HE IS HUNGRY
AND WANTED SOMETHING TO EAT. REQUESTED VANILLA PUDING ATE 1OO% OF 2 PUDDING
CUPS WITH FEW SIPS OF WATER. WOUND DRESSING DONE. FOLLEY INTACT AND PATENT.
FALL PREC IN PLACE AND WILL CONT TO MONITOR.

## 2020-10-15 NOTE — NUR
ASSUMED CARE OF PT AT 0715. PT IS CONFUSED, FORGETFUL, AND HALLUCINATING. PT
IS ALERT TIMES TWO. PT HAD A LARGE LOOSE BM. PT HAS A WOUND WITH SEROUS
SEROSANGUINEOUS DRAINAGE, WITH SLOUGHING. DAKIN'S WAS APPLIED TO THE WOUND
WITH 4X4 GAUZE, ABD AND TAPE. PT IS BEING REPOSITIONED WITH Q2T. PT TOOK MEDS
APPROPRIATELY TODAY. FALL PRECAUTIONS IN PLACE, WILL CONTINUE TO MONITOR.

## 2020-10-15 NOTE — NUR
ON-GOING ASSESSMENT: CM REVIEWED CHART AND SPOKE WITH ATTENDING AS WELL AS
LIASON FROM [facility]. LIASON STATING PATIENT RECEIVED IM HALD[facility] YESTERDAY
SO HE WILL HAVE TO REMAIN IM FREE FOR 48 HOURS. PT IS COOPERATIVE AND TAKING
MEDICATIONS AT THIS TIME. [facility] STATES IF PATIENT CONTINUES TO COOPERATE THEY
WILL REVIEW AND LIKELY BE ABLE TO TAKE HIM TOMORROW TO SNF. THEY REQUESTED AN
UPDATED COVID TEST. CM NOTIFIED ATTENDING AND TEST IS PENDING. CM SPOKE WITH
PATIENTS SON/DPOA SATYA TO UPDATE AND HE IS AGREEABLE PLAN TO DISCHARGE TO
[facility]. CM FAXED ROSIBEL AT [facility] UPDATES AS WELL AS DPOA PAPERWORK.
CM WILL CONTINUE TO FOLLOW TO ASSIST AS NEEDED.

## 2020-10-16 NOTE — NUR
PT CARE ASSUMED WITH PT IN BED WITH WIFE AT THE BEDSIDE AT 1900.PT IS TOTAL
CARE AND X2 ASSIST.PT HAS CHING IN PLACE FOR RETENTION.PT HAS WOUND DRESSING
DONE ON SACRAL AREA.PT IS Q2 TURN.PT IS ALERT AND CONFUSE.WILL CONTINUE TO
MONITOR

## 2020-10-16 NOTE — NUR
ON-GOING ASSESSMENT: CM REVIEWED CHART AND SPOKE WITH ATTENDING. PLAN WAS FOR
DISCHARGE TO [facility] TODAY BUT COVID TEST WAS NOT COMPLETED AS
ORDERED YESTERDAY.  COVID TEST WAS ORDERED 10/15 BUT NOT COLLECTED UNTIL 10/16
AND LAST BED WAS GIVEN SO [facility] CANNOT TAKE PATIENT UNTIL SATURDAY 10/17.
CM NOTIFIED ATTENDING. PLANS IS FOR PATIENT TO GO TO [facility] TOMORROW
10/17 CONTACT [facility] [phone] TO NOTIFY IF DISCHARGE
AND HAVE THEM ARRANGE TRANSPORTATION/ASK BEST NUMBER TO FAX DISCHARGE ORDERS.
NOTIFY PATIENTS SON/DPKAYCE HERNADEZ [phone] OF DISCHARGE.  CHART COPY WILL NEED
TO BE SENT WITH [facility] FAX:[phone]

## 2020-10-16 NOTE — NUR
PT ASSESSED AT START OF SHIFT. AWAKE BUT DROWSY. ATE SOME BKFT BUT DIDN'T WANT
LUNCH. TURNED Q2HRS. DSNG TO BOTTOM. WIFE AT BEDSIDE. COVID TEST PENDING. TO
SNU WHEN BED AVAILABLE.

## 2020-10-16 NOTE — NUR
PATIENT RESTING QUIETLY IN BED WIFE AT BEDSIDE PATIENT  % OF DINNER
WIFE FED PATIENT. NO PAIN AT THIS TIME.

## 2020-10-17 NOTE — NUR
1330 recieved patient from the 4th floor via bed, RN and TECH. Patient placed
on full ICU monitoring. Patient arousable. HR in the 50's. SBP dropped from
125 to 65. Initiated IV fluid bolus NS. 1st unit of blood started at 1445. SBP
increased to 116. HR remains in the 50's. O2 sat 100% on 2L/NC. No bloody
stools yet, in ICU. 2nd PIV placed.
1500- Patient to Nuc Med for bleeding scan. ICU RN to accompany. Wife at
bedside and staying in room while patient is gone.

## 2020-10-17 NOTE — NUR
PT SLEEPING MOST OF THE SHIFT. HE OPENS HIS EYES ASKING 'WHAT?" WHEN I CALL
HIS NAME.Q2HR TURN PROVIDED. BRIGHT RED BLOOD NOTED COMING OUT OF HIS
RECTUM.PT DENIES ABDOMINAL PAIN. SACCRAL DRSG SOILED THEREFORE CHANGED.BP DOWN
TO 98/57. FELIX DUKE NP PAGED FOR ORDERS. PT IS ALERT TO SELF.AFEBRILE.
DOING OKAY ON ROOM AIR.HOWEVER PLACED HIM ON 1L JUST FOR COMFORT.DRINKING
WATER OKAY, TAKING MEDS FINE.

## 2020-10-17 NOTE — NUR
PT WAS TO DC TODAY TO [facility] DURING THE NIGHT PT HAD BLEEDING AND GI
WAS CONSULTED SO DC ON HOLD AND I NOTIFIED ROSIBEL IN ADM AT [facility].

## 2020-10-17 NOTE — NUR
Assumed care of pt at 0700. At shift change noted large amount bloody stool.
Provider notified. New orders noted. Q2h turn. Dressing changed. Feeder. Larson
catheter in place. Family at bedside. Call light within reach. Will continue
to monitor.

## 2020-10-18 NOTE — NUR
1145- Dr. Fernandez here with GI. He spoke with wife at bedside. Will continue to
monitor for bleeding and Hgb. Will advance diet as tolerates. Patient woke and
took pills fairly well. Patient was awake for awhile and chatted with wife.
Asking to go home. Pt repositioned and has been sleeping since.

## 2020-10-18 NOTE — NUR
Patient stable this shift. Heart rate and rhythm stable. Alexis to SR in the
70's. SBP stable. Held Coreg this PM as precaution, if patient bleed
overnight, HR 52 and patient did have a smear of seng red blood from rectum.
Oxygenation stable. Patient refused lunch but ate about 40% of dinner. Large
u/o. Updated patient wife about plan of care and patient current condition.
Patient is progressing towards getting out of the ICU if no further bleeding.
See documentation on interventions for assessment details.

## 2020-10-18 NOTE — EKG
[facility]
[place]   [address]                     ELECTROCARDIOGRAM REPORT      
_______________________________________________________________________________
 
Name:       IRIS HAND      Room #:         248-P       ADM IN  
M.R.#:      [id number]                       Account #:      [id number]  
Admission:  20    Attend Phys:    David Hodges MD      
Discharge:              Date of Birth:  [birth year]  
                                                          Report #: [id number]
                                                                    [id number]
_______________________________________________________________________________
THIS REPORT FOR:  
 
cc:  [facility] - No family physician/PCP 
     [facility] - No family physician/PCP 
     Fabricio Montemayor [facility]                                        ~
THIS REPORT FOR:   //name//                          
 
                          [facility]
                                       
Test Date:    2020-10-17               Test Time:    14:02:59
Pat Name:     IRIS HAND           Department:   
Patient ID:   [id number]            Room:         248
Gender:       M                        Technician:   SADIE ALEMAN
:          1945               Requested By: Ernst Perry
Order Number: [id number] MD:   Fabricio Montemayor
                                 Measurements
Intervals                              Axis          
Rate:         54                       P:            0
MN:           137                      QRS:          -79
QRSD:         145                      T:            -11
QT:           460                                    
QTc:          436                                    
                           Interpretive Statements
Sinus rhythm
Right bundle branch block
Inferior infarct, old
Compared to ECG 2020 13:00:59
Ventricular premature complex(es) no longer present
Electronically Signed On 10- 11:14:37 CDT by Fabricio Montemayor
[email or web address]
 
 
 
 
 
 
 
 
 
 
 
 
 
 
  <ELECTRONICALLY SIGNED>
   By: Fabricio Montemayor MD, FACC   
  10/18/20     1114
D: 10/17/20 140                           _____________________________________
T: 10/17/20 1402                           Fabricio Montemayor MD, FACC     /EPI

## 2020-10-18 NOTE — NUR
Dr. Perry here. Updated on patient status. No orders for transfer today.
Will continue to monitor here in the ICU. Repeat HGB if patient has another
bloody stool. Keeping NPO except meds and ice chips. Awaiting GI input.

## 2020-10-19 NOTE — NUR
ASSUMED CARE OF PT 0645. WAKES EASILY, HELPED FEED SELF FOR BREAKFAST. ASKED
DIETARY TO CHANGE SUPPLEMENT TO PUDDING. [facility] MEDS. OFF O2. DR HARRIS TX TO
[facility] TELE. MOVED  AT 1100. WIFE AT BEDSIDE. BELONGINGS AND MEDS SENT
WITH PT.

## 2020-10-19 NOTE — NUR
on-going assessment: CM REVIEWED CHART AND RECEIVED A CaLL FROM ROSIBEL AT
[facility] TO GET AN UPDATE AS PLANS WERE FOR PATIENT TO LIKELY GO TO SNF
SATURDAY. PATIENT WAS TRANSFERRED TO THE ICU OVER THE WEEKEND AND GI WAS
CONSULTED. PTS HMG DROPPED AND CONTINUING TO MONITOR. CM FAXED UDPATED
CLINICAL TO ROSIBEL AT [facility]. CM WILL CONTINUE TO FOLLOW TO ASSIST AS
NEEDED.

## 2020-10-20 NOTE — NUR
ASSUMED PT CARE AROUND 1930. VSS. KEPT NPO FOR GI PROCEDURE IN AM. NO S/S
ACUTE DISTRESS NOTED OR REPORTED AT THIS TIME. WILL CONT TO MONITOR FOR ANY
CHANGES IN CONDITION.

## 2020-10-20 NOTE — NUR
performed bowel prep for sigmoid colon procedure done, bed bath, wound care,
and bed linen changed afterward. Patient is sleeping comfortably supine with
HOB slightly elevated (20 degrees). Wife is present today. She keeps him calm
and advocates for his care appropriately. Provided education to wife about
sacral wound and care interventions implemented to improve wound. Patient
agrees to safety precautions and q2h turns.

## 2020-10-20 NOTE — NUR
Assumed pt care this am, tap enema done in the am. EGD done mid morning. Wife
at the bedside  the whole day. Pt refused meals when he got back to the unit,
did not want to take medications as well. Told us if he forced him to eat he
would spit it out ot us. Q2 turns done, wound care and dressing changes done.
POC followed. Wife expressed her concern over the sacral wounds that she
witnessed was oozing with thisk yellow purulent fluid upod dressing change.
Informed woundcare nurse Stiven regarding this, was advised MD will be
rounding. Endorsed to the night nurse.

## 2020-10-20 NOTE — NUR
PT HAD FLEX SIG DOONE THIS AM. PT WAS DISIMPACTED AND ULCER WAS FOUND. CM
NOTIFED [facility] THAT PT MAY BE READY FOR DC THIS DAY AND THEY REQUESTED UPDATED
THERAPY PRG NOTES. PT AND OT HAD BEEN ON HOLD AWAITING ORDERS TO RESUME AFTER
PT LEFT ICU. ORDERS SINCE ENTERED. PT AND OT HADN'T SEEN PT SINCE 10/15. CM
AWAITING UPDATED THERAPY NOTES TO SEND TO THE FACILITY. ANTICIPATE POSSIBLE DC
TOMRORROW. CM TO FOLLOW AS INDICATED WITH DC PLANNING.

## 2020-10-21 NOTE — NUR
ASSUMED PT CARE AROUND 1930, DRESSING CHANGED PER MD ORDER. NO S/S ACUTE
DISTRESS NOTED OR REPORTED AT THIS TIME. WILL CONT TO MONITOR FOR ANY CHANGES
IN CONDITION.

## 2020-10-21 NOTE — NUR
Patient forgot to bring her  License and Medicare card. [phone]
called, the patient answered the call, claimed that she would come to pick
them up tomorrow at the . will pass this on.

## 2020-10-21 NOTE — NUR
CARE TEAM INDICATED THAT PT IS MEDICALLY STABLE TO DC THIS DAY. WC DID BEDSIDE
DEBRIDEMENT PRIOR TO DC. CHART COPY MADE. ORDERS FAXED TO FACILITY. REPORT
CALLED BY NURSE TO [phone]. CM NOTIFEID PT AND SPOUSE AT BEDSIDE AND
CALLED HC JESSENIA HERNADEZ AND NOTIFIED HIM AS WELL. ST[facility]ER TRANSPORT ARRANGED FOR
1530. NO OTHER CM INTERVENTION INDICATED. CASE CLOSED.

## 2020-10-21 NOTE — NUR
Patient left with tele monitor, Ms. Miles stated that she would manage it.
The staff offered to drive to [place] to pick it, Ms. Miles voiced," You
are not requested to go to pick it." The staff called the nurse Anupama about
the tele monitor, Anupama voiced that she would leave the monitor at the desk
for us.

## 2020-10-21 NOTE — NUR
CM SENT UPDATED PT, OT, AND ST NOTES TO [facility] THIS AM FOR REVIEW FOR HOPEFULL DC
TO SNF TODAY. CM TO FOLLOW AS INDICATED WITH DC PLANNING.

## 2020-10-21 NOTE — NUR
Lory [id number] , report given to Anupama; it was included in the report
that the patient just had debrigement on the wound on the buttom.

## 2020-10-21 NOTE — P
[facility]
[address]
[place]   [address]                     PROCEDURE REPORT              
_______________________________________________________________________________
 
Name:       IRIS HAND      Room #:         464-P       ADM IN  
M.R.#:      [id number]                       Account #:      [id number]  
Admission:  09/23/20    Attend Phys:    David Hodges MD      
Discharge:              Date of Birth:  [birth year]  
                                                          Report #: [id number]
                                                                    [id number]   
_______________________________________________________________________________
THIS REPORT FOR:  
 
cc:  [facility] - No family physician/PCP 
     [facility] - No family physician/PCP                                        
     Mayito Samuel MD                                   ~
CC: [facility] physician/PCP
    Digna Hodges
 
DATE OF SERVICE:  10/20/2020
 
 
PROCEDURE PERFORMED:  Fecal disimpaction and flexible sigmoidoscopy.
 
HISTORY OF PRESENT ILLNESS:  The patient is a 75-year-old male with multiple
medical problems including dementia, bedridden with bright red blood per rectum.
 He had several bloody bowel movements and nuclear medicine scan was performed
on 10/17/2020 that was negative for active bleeding.  His hemoglobin today is
9.0, hemoglobin has dropped from 10.8 on admission to a low of 7.8.  He has
received 1 unit of packed cells on 10/17/2020.  Because of the patient's mental
status, it was felt it would be unlikely for him to be able to prep for
colonoscopy therefore, the plan is for flexible sigmoidoscopy today.  Of note,
the patient was on Xarelto.  This has been held since the 10/16/2020.
 
DESCRIPTION OF PROCEDURE:  The risks and benefits of the procedure were
explained to the patient's wife those risks including but not limited to
bleeding, perforation and the risk of sedation.  She understood these risks and
gave informed consent.  Sedation was given using propofol per anesthesia.  Next,
a digital rectal exam showed evidence of a fecal impaction.  I proceeded with a
digital disimpaction with a large amount of stool removed from the rectum, there
was some maroon and some red old blood noted within the stool as it was removed.
 At this point, a pediatric Olympus colonoscope was then placed in the patient's
anus and advanced under direct vision into the rectum.  There was a large amount
of remaining stool, but I was able to visualize some of the rectal mucosa. 
Multiple washings and aspirations were performed.  I was able to advance the
scope into the rectosigmoid region.  Again, most of the area was not visualized
due to stool; however, areas of mucosa in the rectosigmoid colon were normal as
well as some areas that were normal in the rectum; however, in the distal
rectum, a single ulcer was noted.  I suspect this is due to the fecal impaction.
 There was no active bleeding.  No obvious hemorrhoids were seen.  At this
point, the ulcer was approximately 2 cm in diameter.  At this point, the scope
was then withdrawn and the procedure terminated.  The patient tolerated the
procedure well.
 
IMPRESSION:  Solitary rectal ulcer in the distal rectum, likely secondary to
 
 
 
[facility]
[address] [place]   [address]                     PROCEDURE REPORT              
_______________________________________________________________________________
 
Name:       YAZANIRIS XAVIER      Room #:         464-P       [facility] [place]  
M.R.#:      [id number]                       Account #:      [id number]  
Admission:  09/23/20    Attend Phys:    David Hodges MD      
Discharge:              Date of Birth:  [birth year]  
                                                          Report #: [id number]
                                                                    [id number]   
_______________________________________________________________________________
fecal impaction, now status post disimpaction.  No active bleeding at this time.
 
RECOMMENDATIONS:
1.  We will start a bowel regimen of daily MiraLax.
2.  We will start Analpram per rectum t.i.d. and continue to monitor closely.
 
Thank you for allowing me to participate in his care.
 
 
 
 
 
 
 
 
 
 
 
 
 
 
 
 
 
 
 
 
 
 
 
 
 
 
 
 
 
 
 
 
 
 
 
 
 
  <ELECTRONICALLY SIGNED>
   By: Mayito Samuel MD   
  10/21/20     1242
D: 10/20/20 1312                           _____________________________________
T: 10/20/20 1929                           Mayito Samuel MD     /nt

## 2020-10-23 NOTE — NUR
Blood rate increased at this time due to the fact that patient's BP has
dropped. Surgeon notified of this. Blood increased to 975 ml/hr even though it
has not been 15 minutes.

## 2020-10-23 NOTE — NUR
This RN to patients bedside at 1900. Re[facility] from [facility] nurseJanie. Pt to ICU room 240 at 1830. Patient still drowsy from surgery but is
quick to arouse. Pt follows commands and nods head appropriately upon
asessment. This RN called LAYLA Caceres for admission orders but was instructed
to contact Dr. Lynch. Dr. Lynch order fluids, code status, and NPO status.
Instructed further admission orders and med rec would be completed in the AM.
Patient resting, VS stableand fluids started. This RN also spoke to Cordelia,
wife, during shift change and updated her on status. Will continue to monitor.

## 2020-10-24 NOTE — NUR
This RN called LAYLA Caceres regarding patients low urine output. Patient is
only averaging about 15 mLs an hour the past 3 hours. No new orders received.
Will continue to monitor.

## 2020-10-24 NOTE — NUR
Patient stable this shift. Patient seen by therapies and appripriate diet
ordered by speech. Patient did not want to eat this evening. Will try again
later. Wife at bedside most of the day. Updated with plan of care. Heart rate
and rhythm stable blood pressures stable. No further rectal bleeding. Dr. Andrade here to see patient's wound. Ordered Dakins wet to dry BID. He also
talked with Dr. Renteria about taking the patient to OR for an I&D of the
sacral and left gluteal wounds. Patient remains drowsy. Gave pain medication
x1 this shift. Floey in place with low U/O. IVF continue at 100ml/hr. See
documentation on interventions for assessment details.

## 2020-10-24 NOTE — NUR
This RN called LAYLA Caceres regarding patients low urine output. Morris had
me order at [facility] to see how patients labs are looking this morning. This is the
only order give. Will continue to monitor and pass on to day shift.

## 2020-10-24 NOTE — NUR
PT REFUSED TO EAT DINNER THIS EVENING. HE DID EAT A CUP OF ICE CREAM WITH HIS
BEDTIME PILLS. LOW-GRADE TEMP THIS EVENING. PT WAS SLEEPING UNDER MULTIPLE
BLANKETS AND SHEETS. TOOK OFF MOST OF THE BLANKETS AND DECREASED ROOM
TEMPERATURE. WILL REASSESS TEMP AROUND MIDNIGHT. PT HAS BEEN SLEEPING MOST OF
THE EVENING. HE IS EASILY ARROUSABLE AND WAS COOPERATIVE WITH NURSING CARE.
REPOSITIONED WITH PILLOWS. HE DID MOAN WITH PAIN WHEN HIS LEGS WERE
REPOSITIONED. WILL CONTINUE TO MONITOR DURING THE NIGHT.

## 2020-10-25 NOTE — HC
[facility]
[address]
[place]   [address]                     CONSULTATION                  
_______________________________________________________________________________
 
Name:       STEF LOWE      Room #:         242-P       ADM IN  
M.R.#:      [id number]                       Account #:      [id number]  
Admission:  10/23/20    Attend Phys:    Rustam Lynch MD   
Discharge:              Date of Birth:  [birth year]  
                                                          Report #: [id number]
                                                                    [id number]   
_______________________________________________________________________________
THIS REPORT FOR:  
 
cc:  [facility] - No family physician/PCP 
     [facility] - No family physician/PCP                                        
     Alonzo Andrade MD                                          ~
 
 
DATE OF SERVICE:  10/24/2020
 
 
WOUND CARE CONSULTATION NOTE
 
REASON FOR CONSULTATION:  Sacral and left gluteal pressure sore.  The patient
admitted for hematochezia, surgically controlled.
 
HISTORY OF PRESENT ILLNESS:  The patient is a 75-year-old gentleman known to the
wound care team from previous hospitalization of several weeks.  The patient was
admitted through the Emergency Room with copious bright red rectal bleeding and
was taken to the operating room by Dr. Sridhar Renteria for oversewing of a
bleeding rectal ulcer.  The patient's lowest hemoglobin and hematocrit was
8.5/25.5.  The patient had sacral and gluteal pressure sores known from previous
admission.  Wound care was consulted due to sacral and gluteal pressure sore.
 
PAST MEDICAL HISTORY:  Dementia, debility and immobility, severe weakness,
spinal stenosis, cervical spondylosis, history of psychosis, coronary artery
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
 
PHYSICAL EXAMINATION:
GENERAL:  Shows a chronically ill-appearing, alert gentleman, eating lunch,
sitting erect up in bed.  His wife is present.
HEENT:  Mucous membranes are moist.
NECK:  Supple, with full range of motion.
LUNGS:  Respirations unlabored.
ABDOMEN:  Soft.
EXTREMITIES:  Focused physical examination shows a 3.5 x 3 cm stage 3 sacral
pressure sore, which is foul smelling with black gray necrosis of its surface. 
There is no palpable bone.  There is no surrounding cellulitis.  There is a
contiguous linear 4 cm x 1.5 cm x 0.5 cm deep left gluteal pressure sore.  No
extremity wounds.
 
IMPRESSION:
1.  Dementia.
2.  Rectal bleeding from rectal ulcer.  Surgical hemostasis achieved. 
Transfused 2 units of packed red blood cells.
3.  General debility and immobility.
4.  Coronary artery disease.
 
 
 
[facility]
[address] [place]   [address]                     CONSULTATION                  
_______________________________________________________________________________
 
Name:       MYNORSTEF ALEA      Room #:         242-P       [facility] [place]  
[facility].#:      [id number]                       Account #:      [id number]  
Admission:  10/23/20    Attend Phys:    Rustam Lynch MD   
Discharge:              Date of Birth:  [birth year]  
                                                          Report #: [id number]
                                                                    [id number]   
_______________________________________________________________________________
 
 
5.  Diabetes mellitus type 2 with skin ulcer.
6.  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
7.  Left gluteal stage 3 pressure sore.
 
PLAN:  Ordered a quarter strength Dakin's packing to the malodorous necrotic
sacral pressure sore, also for the left gluteal pressure sore, had a discussion
with Dr. Renteria, a general surgeon.  We will encourage surgical debridement
of the sacral wound due to extensive surface necrotic tissue.  Dr. Renteria
stated he will evaluate the patient and consider for debridement.
 
 
 
 
 
 
 
 
 
 
 
 
 
 
 
 
 
 
 
 
 
 
 
 
 
 
 
 
 
 
 
 
 
  <ELECTRONICALLY SIGNED>
   By: Alonzo Andrade MD          
  10/25/20     1153
D: 10/24/20 1246                           _____________________________________
T: 10/24/20 1322                           Alonzo Andrade MD            /nt

## 2020-10-25 NOTE — NUR
PT SLEPT THROUGHOUT THE NIGHT, BUT WAS EASILY ARROUSABLE FOR NURSING
ASSESSMENTS. RESPIRATIONS EVEN AND UNLABORED. LOW-GRADE FEVER RESOLVED.
REPOSITIONED TO PREVENT FURTHER SKIN BREAKDOWN. VSS. SPO2 >92% ON RA. WILL
CHANGE DRESSING TO SACRAL WOUND THIS MORNING. NO SIGNS OF RECTAL BLEEDING
NOTED. PROGRESSING SLOWLY TOWARD POC GOALS.

## 2020-10-25 NOTE — NUR
>>>0730 Bedside shift report received. pt is awake, alert to self. Care
assumed. Assessments done as documented. PT ATE 75% of breakfast.
>>>1000 pt getting less than 30 ml/hr of urine output. Linares catheter flushed
flushed. Linares catheter not kinked.
>>>1100 Pt still not getting enough urine output of 30ml/hr. Bladder scan
done. 224ml of urine noted in the bladder. Dr Lynch notified. Gave order to
replace linares catheter and call back if no change is noted.
>>>1230 Linares catheter replaced. Pt tolerated procedure with no complications.
Will continue to monitor.

## 2020-10-25 NOTE — NUR
Assumed care of patient after transfer at 1700. Patient stable upon arrival.
Appetite appeared to be normal as he ate most of his dinner. Fall precautions
in place.

## 2020-10-26 NOTE — NUR
ASSUMED PT CARE AROUND 1930. ALERT AND AWAKE. VSS. NO S/S ACUTE DISTRESS NOTED
OR REPORTED AT THIS TIME. WILL CONT TO MONITOR FOR ANY CHANGES IN CONDITION.

## 2020-10-26 NOTE — NUR
Received awake on bed. Due medications given as prescribed, able to swallow
meds w/o difficulty. On mechanically altered ground diet- assisted in eating
and drinking, with poor appetite despite feeding; dietician consult ordered
for supplements. On blood sugar monitoring, taken and recorded accordingly;
with sliding scale ordered- given as prescribed. On MS, not on telemetry; no
complains and signs of chest pain, crushing sensation and heaviness. On room
air. Vital signs stable. With linares in place- output measured and recorded
accordingly; no bleeding noted. Assisted in ADLs. With Ns at 75cc/hr, infusing
well at L FA. With sacral dressing in place.
To continue monitoring patient.

## 2020-10-27 NOTE — NUR
ASSUMED PT CARE AROUND 1930. AXOX1. 1UNIT BLOOD GIVEN. VSS. NO S/S ACUTE
DISTRESS NOTED OR REPORTED AT THIS TIME. KEPT NPO POST MN FOR SURGERY IN AM
WITH .  WILL CONT TO MONITOR FOR ANY CHANGES IN CONDITION.

## 2020-10-27 NOTE — NUR
PT ADMITTED RELATED TO RECTAL HEMMORRAGE. PT HAD DISCHARGED TO [facility] 10/21/20 FOR SKILLED POST ACUTE CARE STAY. PT READMITTED
10/23/20. CM CALLED AND SPOKE WITH PT'S DPOA SON USMAN [phone]. DPOA
PAPERWORK PLACED IN PT'S CHART. SON EXPRESSED FRUSTRATION WITH NO VISITATION
POLICY AT SKILLED FACILITIES DURING COVID AND ONCE AGAIN ASKED ABOUT [facility]. CM
INDICATED THAT AS DURING PREVIOUS STAY PT LIKELY WOULDN'T BE ABLE TO TOLERATE
THE 3 HRS OF ACUTE REHAB THAT [facility] PROVIDE AND THAT SKILLED IS MORE
APPROPRIATE. HE INITIALLY EXPRESSED COME CONCERN ABOUT HOW LONG [facility] TOOK TO
TAKE PT TO HOSPITAL BUT ULTIMATLY INDICATED THAT HE WOULD BE OK WITH PT
RETURNING THERE TO RESUME REHAB SERVICES ON MEDICALLY STABLE. CM INDICATED
THAT CARE TEAM WERE PLANNING TO DO SURGIAL DEBRIDEMENT TODAY AND THAT DR. EPPERSON HOSPITALIST INDICATED THAT PT MIGHT BE MEDICALLY STABLE TO DC BACK TO
[facility] TOMORROW. CLINICAL UPDATE AND NEGATIVE COVIDE TEST FROM 10/26 WERE FAXED
TO FACILITY. CM TO FOLLOW AS INDICATED WITH DC PLANNING.

## 2020-10-27 NOTE — NUR
Assumed pt care at 7am.Pt in bed sleeping on and off.Assessment completed.vss.
Pt kept npo for i&d scheduled for 12noon today.Cardiac meds given with sips of
water.Repositioned for comfort q2h in bed.Dr Lynch here,no new order noted.
Pt left for i&d per bed at 12noon accompanied by or rn.Will continue to
monitor.

## 2020-10-28 NOTE — NUR
Received awake on bed. Due medications given as prescribed, able to swallow
meds w/o difficulty- on swallow precautions. Alert to self. On room air. Vital
signs stable. On regular diet- assisted and encouraged in eating and drinking.
On blood sugar monitoring, taken and recorded accordingly; with sliding scale
insulin ordered. With linares in place- output measured and recorded
accordingly. Able to have 2 large bowel movement today- Dr Lynch informed;
charted. With SL at R upper arm and L upper arm.
With sacral wound- dressing changed today; with blister at L posterior knee-
dressing changed; photo taken as well- Wound team had their rounds today-
charted.

## 2020-10-28 NOTE — NUR
ASSUMED CARE OF PT AT 1900HRS. PT AOX1 AND REQUIRES TOTAL CARE. FALL
PRECAUTION IN PLACE. O2 CONTINUED AT 2L VIA NC. PT WAS TURNED Q2H. PT DENIED
PAIN OR NAUSEA. PT WAS ABLE TO TAKE ALL HS MEDS. PT WAS ABLE TO GET
COMFORTABLE AND SLEEP PART OF THE SHIFT. VSS AND NO S/S OF ACUTE DISTRESS.
WILL CONTINUE TO MONITOR.

## 2020-10-29 NOTE — NUR
ASSUMED PT CARE AROUND 1930. AXOX1. DOES NOT WANT TO HOLD CONVERSATION. DOES
NOT FOLLOW DIRECTIONS VERY WELL AT THIS TIME. NO S/S ACUTE DISTRESS NOTED OR
REPORTED AT THIS TIME. WILL CONT TO MONITOR FOR ANY CHANGES IN CONDITION.

## 2020-10-29 NOTE — PATH
[facility]
[address]
[place]   [address]                     PATHOLOGY RPT PROCEDURE       
_______________________________________________________________________________
 
Name:       ANDREW CARROLL      Room #:         464-P       ADM IN  
M.R.#:      [id number]     Account #:      [id number]  
Admission:  10/23/20    Date of Birth:  [birth year]  
Discharge:                                              Report #:    [id number]
                                                        Path Case #: [id number]
_______________________________________________________________________________
 
LCA Accession Number: [id number]
.                                                                01
Material submitted:                                        .
sacrum - SACRAL ULCERATION
.                                                                01
Clinician provided ICD-10:
K62.5
L89.303
.                                                                01
Clinical history:                                          .
RECTAL HEMMORRHAGE, BUTTOCK WOUND
.                                                                02
**********************************************************************
Diagnosis:
Sacral ulceration, debridement:
- Skin and subcutanous tissue showing marked acute inflammation as well as
fibrinoid degeneration along with ulceration.
(IUV:justyn; 10/29/2020)
QMS  10/29/2020  1501 Local
**********************************************************************
.                                                                02
Electronically signed:                                     .
Alfreda Arguelles MD, Pathologist
NPI- [id number]
.                                                                01
Gross description:                                         .
The specimen is received in formalin, labeled "Andrew Carroll ., sacral
ulceration".  Received is a segment of pale tan to dusky gray-brown
necrotic-appearing skin with attached underlying fibroadipose tissue
measuring 9.5 x 4.4 x 3.6 cm in greatest dimensions.  The specimen is
submitted representatively in cassette A1.
(CAA; 10/28/2020)
QA/QA  10/28/2020  1146 Local
.                                                                02
Pathologist provided ICD-10:
L98.9, L98.499, K62.5, L89.303
.                                                                02
CPT                                                        .
210272
Specimen Comment: A courtesy copy of this report has been sent to [phone]
[phone]
Specimen Comment: [phone]
Specimen Comment: Report sent to  / DR EPPERSON
***Performed at:  01
   [address], [place]  [id number]
   MD Enmanuel Brandt MD Phone:  [phone]
 
 
[place] [address] [place]   [address]                     PATHOLOGY RPT PROCEDURE       
_______________________________________________________________________________
 
Name:       ANDREW CARROLL JR     Room #:         464-P       ADM IN  
M.R.#:      [id number]     Account #:      [id number]  
Admission:  10/23/20    Date of Birth:  [birth year]  
Discharge:                                              Report #:    [id number]
                                                        Path Case #: [id number]
_______________________________________________________________________________
***Performed at:  02
   [facility] [place]  [id number]
   MD Alfreda Arguelles MD Phone:  [phone]

## 2020-10-29 NOTE — NUR
PT IS TO HAVE REPEAT DEBRIDEMENT TOMORROW. DR. EPPERSON WAS TO CONSULT DR. ROBINS
TO SPEAK WITH PT AND FAMILY AGAIN. CLINICAL UPDATED SENT TO [facility].  TO
FOLLOW AS INDICATED WITH DC PLANNING.

## 2020-10-29 NOTE — NUR
Assumed pt care this am, pt is a total care and feeder. Wife at the bedside
assisted with feeding, small frequent feedings and hydration done through out
the shift. Medications crushed and given in apple sause. Multiple soft liquid
stools through out the day, dolly care and partial bed bath given. Wound care
and dressing change done when dolly care was given (3x), Vs stable. POC
followed, pt is short tempered and would snap at the wife and staff,
redirected by the wife. Pt is NPO midnight tonight , wife and pt aware for
debridment felicitas am with Dr. Renteria. Q2 turns done, max assists of 2 - 3 ON
telemetry endorsed to the night sift.

## 2020-10-30 NOTE — NUR
PT WENT FOR REPEAT SURGICAL DEBRIDEMENT THIS DAY. CM WAS NOTIFIED BY [facility] THAT THEY AREN'T ABLE TO ACCEPT PT BACK AS HE HAD BEEN ON THE SKILLED
MEMORY ARE UNIT AND THEY HAD A CASE OF COVID AND IT'S NOW AS COVID UNIT. CM
CALLED AND NOTIFIED PT'S SON BERNARD MARY. CM EMAILED HIM A LIST OF SKILLED
FACLILITIES. TO REVIEW WITH THE FAMILY. PHYSICIAN HAD NENTIONED CONSULTING DR. ROBINS BUT NO CONSULT HAS BEEN ENTERED AND SHE STATED THAT PT REMAINS A FULL
CODE AT THIS TIME AND THAT SHE WOULDN'T BE CONSULTING AT THIS TIME. CM TO
FOLLOW AS INDIATED WITH DC PLANNING.

## 2020-10-30 NOTE — NUR
ASSUMED PT CARE AROUND 1930. AXOX1. VSS. SACRUM DRESSING CHANGED PER MD
ORDER. KEPT NPO POST MN FOR SURGERY IN AM. NO S/S ACUTE DISTRESS NOTED OR
REPORTED AT THIS TIME. WILL CONT TO MONITOR FOR ANY CHANGES IN CONDITION.

## 2020-10-30 NOTE — NUR
ASSUMED CARE OF PATIENT THIS AM. ASSESSMENT AS CHARTED. AM MEDS HELD THIS
MORNING PER NPO STATUS; BETA BLOCKER GIVEN W A SMALL AMNT OF PUDDING. PATIENT
WENT DOWN FOR A DEBRIEDMENT THIS AM AND RETURNED AT APPROX 1300. POST OF VSS.
PATIENT WAS DROWSY BUT AWOKE TO TAKE PM MEDS. ON 2 L D/T LOW 02 SAT IN OR.
REFUSED TO EAT LUNCH AND DINNER. CASTRO INTACT AND IN PLACE. OR NOTED DRESSING
C/D/I. SPOUSE AT BEDSIDE. FALL PRECAUTIONS IN PLACE. WILL CONTINUE TO MONITOR

## 2020-10-31 NOTE — NUR
PT ASSESSED AT START OF SHIFT. NO C/O PAIN. SPEECH IS IN WHISPER-ENC PT TO
SPEAK OUT LOUD AND HE IS ABLE AT TIMES. SWALLOW PRECAUTIONS MAINTAINED. VERY
POOR APPETITE. WIFE IN TRYING TO ENC PT TO EAT MORE. TURNED Q2HRS.
DR. DENNEY IN THIS AFTERNOON TO CHECK ON DSNG. DR. BAUTISTA IN THIS AFTERNOON.

## 2020-10-31 NOTE — NUR
Care assumed of patient at 1915: Patient resting quietly in bed at start of
shift.  Pleasantly confused.  Denies pain or discomfort, no s/s of pain or
discomfort.  O2 2L NC worn for comfort.  Turned q2 hours.  Dressings changed
to posterior left knee, right lateral ankle and right knee.  Dressing changed
to buttock.  Moderate amount of bright red blood draining from wound bed.
Bleeding was able to be stopped by pressure and new dressing placement.  SCDs
in place to bilateral lower extremities.  Fall risk precautions in place.  PO
fluids encouraged with each encounter.  Total assist required.  Declined HS
snack.  Resting quietly in bed at this time.

## 2020-11-01 NOTE — NUR
PT ALERT AND ORIENTED TIMES TWO. SLOW TO RESPOND, AND WHISPERS WHEN SPEAKS.
PT TOLERATES MEDS. POOR APPETITE. SACRAL WOUND DRESSING CHANGED. PAIN
MEDICATION GIVEN. PT WIFE AT BEDSIDE. WILL CONTINUE TO MONITOR.

## 2020-11-01 NOTE — NUR
VSS-AFEBRILE.  ALERT AND ORIENTED X 1-2, LUNGS DIMINISHED IN ALL JUAN
BILATERALLY.  REMAINS ON 2.5LNC.  MOANS, AND C/O SIGNIFICANT PAIN WHEN TURNED,
AND MOSTLY WHEN DOING WOUND CARE.  MEDICATED THROUGH NIGHT WITH IV FENTANYL,
WAS ABLE TO SLEEP AND TOLERATED WOUND INTERVENTIONS MUCH EASIER.  TURNED AND
OFFERED ORAL HYDRATION EVERY TWO HOURS FOR COMFORT.  WOUND CARE PERFORMED,
COPIOUS AMOUNTS OF SANGUINEOUS FLUID TO DRESSING ON SACRUM.  FALL PRECAUTIONS
IN PLACE, CALLS APPROPRIATELY FOR ANY NEEDED ASSISTANCE.

## 2020-11-02 NOTE — NUR
Received awake on bed. Due medications given as prescribed, able to swallow
w/o difficulty. On room air. Vital signs stable. On swallowing precautions. On
telemetry; no complains and signs of chest pain, crushing sensation noted.
On carb controlled diet- tolerating well; no nausea, no vomiting and no
abdominal pain noted; assisted and encouraged in eating and drinking;
encouraged to drink supplements. Falls bundle in place. On blood sugar
monitoring, taken and recorded accordingly; with sliding scale insulin
ordered. With linares in place- output measured and recorded accordingly;
draining well. With L hand SL- on IV antibiotics. With wife at bedside- update
given.
With sacral wound- dressing in place; no complains of pain made.
Assisted in ADLs. Turned on his sides every 2 hrs.
With consult to Dr Hanna- Us called in consult; a/w physician's rounds.
To continue monitoring patient.

## 2020-11-02 NOTE — NUR
PT HAD REPEAT I&D FRIDAY. PT CONTINUES ON IV ZOSYN. HAD BEEN DISCUSSION OF
POSSIBLE NEED FOR REPEAT I&D. PHYSICIAN SONSULTED DR. ROBINS. CM TO FOLLOW AS
INDICATED WITH DC PLANNING. FACILITY PT HAD BEEN AT SKILLED PTA CAN'T ACCEPT
HIM BACK CM NOTIFIED PT'S SON/DPOA FRIDAY AND EMAILED HIM A NEW LIST. CM TO
FOLLOW AS INDICATED WITH DC PLANNING.

## 2020-11-02 NOTE — NUR
VSS. DRESSING CHANGED PER MD ORDER. NO S/S ACUTE DISTRESS NOTED OR REPORTED AT
THIS TIME. WILL CONT TO MONITOR FOR ANY CHANGES IN CONDITION.

## 2020-11-03 NOTE — NUR
Assumed pt care this am, VS stable blood sugar done and medications given as
per emar. Refused morning meds but took them later on in the pm. Extensive
wound
care and dressing change done since pt had a loose, soft bm that went all the
way into the wound bed. Pain is managed with medication, poor intake of food
but would take his glucerna. Wife was at the bedside for the whole shift. Q2
turns done through out the day. POC followed, pains is managed with
medications. Endorse to the night nurse.

## 2020-11-03 NOTE — NUR
pt. refused morning medications.
refused breakfast and any liquids/ snacks.
wife has sat with him to try to get him to eat, but he is unwilling.
Oriented to own ability, aware of who his wife is.
Knees are contracted in a seated position.
Arms are stiff and unable to move independently.
pt. is completely dependent.
Mood today is agitated, irritiable, and uncooperative.

## 2020-11-03 NOTE — NUR
DR. ROBINS HAD SPOKEN WITH PT'S SPOUSE YESTERDAY. SHE IS TO DISCUSS WITH
CHILDREN AND MAKE DETERMINATIONS RELATED TO NEXT STEPS AND GOALS OF CARE. CM
FOLLOWING.

## 2020-11-03 NOTE — NUR
progress
 
pt a/o to self and situation gets confused but seemed appropriate to me slept
most of shift dressings remained c/d/i repositioned q2 hours denied pain iv
antibiotics as ordered continue poc.

## 2020-11-04 NOTE — NUR
VSS-AFEBRILE.  RESTLESS AND IRRITABLE OVERNIGHT.  REPORTS PAIN WITH ANY
MANIPULATION, MEDICATED THROUGH SHIFT AS PRESCRIBED.  LEFT WRIST IV INTACT, 2+
EDEMA WITH BOTH ARMS.  ELEVATED EXTREMITIES TO EASE SWELLING.  TURNED AND
OFFERED ORAL HYDRATION EVERY TWO HOURS FOR COMFORT.

## 2020-11-04 NOTE — NUR
Assumed pt care this am, VS stable Q2 turns done. Bed bath, wound care ,
dressing change and pictures done. Wife at the bedside, aided in feeding the
pt. Medications crushed and given with food. Wife and sone requested for a
second opinion for surgery as per Dr. Roe, spoke to Dr. Mantilla who will
come and assess and speak to the pt. regarding colostomy options. FC in place
drainig yellow urine.
 
Wife and pt expressed disatisfaction with the interaction with the hospitalist
today. Wife felt she was not ready with what the MD was proposing (comfort,
palliative, hospice care) and mentioned that she had spoke to Dr. Hanna
already and expressed this is not the route they wanto to take at the moment.
 
Endorsed to the night nurse.

## 2020-11-04 NOTE — NUR
CARE TEAM INDICATED THAT FAMILY HAD REQUESTED TO SPEAK WITH DR. DENNEY
AGAIN THIS DAY. CM FOLLOWING REGARDING DC PLANNING.

## 2020-11-05 NOTE — NUR
Assumed pt care at 7am.Assessment completed.vss.Pt dangled at bs for breakfast
Dr Lo np was here early this am.Change drsg to pt lower back.Dr Perea
came later and dc order noted.Pt called his wife for ride.Dc summary complile
and reviewed with pt .Saline lock dc'd.Additional drsg supply given to pt to
take home.At 1205,pt dc home in [place] with wife accompanied by pca.

## 2020-11-05 NOTE — NUR
Assumed pt care at 7am.Pt in bed sleeping on and off with o2 on.Assessment
completed.vss.Pt tolerated meds but has fair appetite.Wife assisted pt with
feeding.He drank 100% of supplement.Turned and repositioned q2 h for comfort.
Dr Mantilla here,he said pt will be havibg colostomy placement on coming
monday.Wife at  assisting with care.Will continue to monitor.

## 2020-11-05 NOTE — NUR
Dr. Mantilla met with pt and his spouse this day. They have decided to proceed
with repeat sacral debridement and diverting colostomy. These procsdures are
to be done Monday 11/09 at 10:00. Cm to follow as indicated with dc planning.

## 2020-11-06 NOTE — NUR
Assessment completed.vss.Pt tolerated meds and diet.Pt in bed more active and
pleasant today.Wife fed pt at all meals.Repositioned q2h for comfort.Large
loose bm noted.Complete bed bath and bed change done.Drsg change done to
sacrum and left buttock.Fall bundle in place.Wife at bs assisting with care.
Dr Elkins here,order noted.Potassium level will be check later this
afternoon.Will continue to monitor.

## 2020-11-06 NOTE — NUR
PT HAVING DIVERTING COLOSTOMY AND DEBRIDEMENT MONDAY 11/09 AT 10:00. CM TO
FOLLOW AS INDICATED WITH DC PLANNING.

## 2020-11-07 NOTE — NUR
Received awake on bed. Due medications given as prescribed, crushed and mixed
with apple sauce. On room air. Vital signs stable. On telemetry; no complains
and signs of chest pain, crushing sensation and heaviness. On clear liquids-
encouraged in drinking; ongoing bowel prep; no signs of nausea, vomiting and
abdominal pain noted. On blood sugar monitoring, taken and recorded
accordingly; with sliding scale insulin ordered. With linares in place- output
measured and recorded accordingly. With sacral wound- dressing changed today
as ordered. Turned every 2 hours, on low airloss mattress, with prafo boots
on. With SL at L hand- on IV antibitoics. Assisted in ADLs.
With blood noted on his stool, Dr Elkins informed and he said he ordered H&H,
discontinued levonox and to continue bowel prep. Concerned with amount of
blood in stool- shown Dr Elkins photo of blood noted- no new orders made-
House supervisor informed re: patient. Vital signs monitored closely.
Tried to reach out to Gastro physician Dr Dominguez- he said to inform
Surgery since patient had ulcer that they had to suture, ?bleeding source-
paged, a/w call back.
To continue monitoring patient.

## 2020-11-07 NOTE — NUR
AT 2110 THIS NURSE WAS 2ND TO VERIFY FROM SON THAT BLOOD TRANSFUSION WAS
CONSENTED. SON STATED YES OF COARSE.

## 2020-11-07 NOTE — NUR
Pt. has been awake most of the shift and is oriented to situation.
Treatment done to sacrum as ordered and iv pain med given for c/o sacral
pain with relief noted (see emar).  He has been turned and repositioned.
Pt. was given a bed bath early this am.  Bed alarm is on.

## 2020-11-07 NOTE — NUR
Pt. needs a blood transfusion and unable to sign consent.  I spoke to Yan
patients DPOA and telephone consent given which was also witnessed by Nhi MEZA.

## 2020-11-08 NOTE — NUR
Pt. rested quietly at intervals during the night when checked on during
frequent rounds.  He has been lethargic and hs meds held as pt. unable to
follow directions to swallow.  Oral mouth care given.  Po fluids offered,
but pt. not wanting to take any.  Wound care given as ordered.  Pt. has
been turned and repositioned during the shift.  He has had minimal amount
of rectal bleeding during the shift.  No c/o pain or discomfort.  He albina-
erated the blood transfusion without difficulty.  Bed alarm is on.

## 2020-11-08 NOTE — NUR
Received awake on bed. Due medications given as prescribed, meds crushed and
mixed with apple sauce. On telemetry; no complains and signs of chest pain,
crushing sensation and heaviness; pt running bradycardic this AM- Dr Elkins
at 9:54am, Carvedilol on hold. On O2 at 2lpm
via nasal cannula, saturating 97%. On clear liquids- tolerating well; no
nausea, no vomiting and no abdominal pain noted; assisted and encouraged in
eating and drinking. On blood sugar monitoring- taken and recorded
accordingly. With linares in place- output measured and recorded accordingly.
With sacral wound- dressing changed this AM, pt on low airloss mattress and
turned regularly on his sides. With SL at L hand- intact; on IV antibiotics.
Pt has been drowsy this AM, blood sugar rechecked- 124- relayed to Dr Elkins;
with upper extremity swelling noted- Dr Elkins informed as well, orders
obtained, pt seen and examined by Dr Elkins; arms kept elevated.
Pt seen and examined by Dr Mantilla this AM, shown him pt's bowel movement this
AM, advised to continue bowel prep; monitor H&H closely and plan is to still
proceed with surgery tomorrow; looking for the pt's wife- informed him she is
still not here. US Venous Upper extremity(Left arm)- done at bedside, pt
tolerated well.
Pt's wife visited this afternoon, update given.
Vital signs more stable this afternoon. K 3.4 from this AM- IV correction
done.
To continue monitoring patient.

## 2020-11-09 NOTE — NUR
RECEIVED REPORT FROM 4 AMRIT RN.PATIENT ARRIVED TO ROOM 203 AROUND 2000.PATIENT
OPENS HIS EYES,WILL ANSWER QUESTIONS BUT CAN BE CONFUSED AT TIMES.PATIENT IS A
TURN Q2.DECUB ULCER NOTED IN HIS SACRUM.CLEANED AND BED CHANGED DONE TWICE
THEN FECAL MGT SYSTEM WAS PLACED.NP WAS INFORMED AND CLARIFIED HEPARIN GTT;SHE
SAID TO START THE DRIP RIGHT AWAY AND DISCONTINUE AT 2 AM.BLOOD TRANSFUSION
WAS DONE AND NO TRANSFUSION REACTION NOTED.NPO SINCE MIDNIGHT FOR A POSSIBLE
PROCEDURE TODAY.MONITOR SHOWS SINUS WITH PV'S.POC CONTINUED.

## 2020-11-09 NOTE — NUR
Recommendations for tube feeding when ready to use PE. If feeding pump is available, start 30ml/hr and progress to goal 70ml/hr of
glucerna 1.2.  Add 150ml water flush every 4hr.
2. If no feeding pump available in house, will need to trial bolus feeds of
glucerna 1.2 to start 1 can TID and progress to goal of 6 cans per day, same
water flushes as above.

## 2020-11-10 NOTE — NUR
WOUND CONSULT;
HERE TODAY FOR WOUND ASSESSMENT WITH JERICHO TINEO RN MSN.  THE WOUND IS
CLEAN.  THERE IS A SKIN SUBSTITUTE STAPLED IN PLACE.  NO S/S OF INFECTION.
THIS PATIENT CANNOT TURN HIMSELF.  HE IS ALERT AND ORIENTED.
 
RECOMMEDNDATIONS;  VAC THEREAPY WILL BE INITIATED.
 
RN PRESENT

## 2020-11-10 NOTE — NUR
CONFUSED.REPOSITIONED Q2 HOURS.HEPARIN GTT STARTED AT 2 AM NEXT PTT WILL BE AT
0800 AM.FMS AND CASTRO INTACT.C/O PAIN.FENTANYL GIVEN.POC CONTINUED.

## 2020-11-10 NOTE — NUR
ASSUMED CARE AT CHANGE OF SHIFT. ALERTX3, CONFUSED AT TIMES. DENIES PAIN,
MTAEO SOB, WOUND VAC PLACED BY WOUND RN. PEG TUB STARTED PER DR NICHOLSON RATE
20CC TODAY. PO CARDIAC MEDS GIVEN VIA PEG. NIRTRO PASTE REMOVED AND DC'D.
PT DOES NOT DEMONSTRATE CALL LIGHT, CLOSE TO NURSING STATION.STAFF TO
ANTICIPATE  NEEDS. FALL PRECUATIONS IN PLACE. STRICT NPO.

## 2020-11-10 NOTE — NUR
FAXED CLINICAL UPDATE TO [facility] RECEIVED CONFIRMATION AND SPOKE WITH
JORDAN IN ADM SHE RECEIVED UPDATE. THEY WILL HAVE A BED AVAILABLE TOMORROW.

## 2020-11-11 NOTE — NUR
OSTOMY CARE;
ASSESSED OSTOMY W/ STAFF RN PEGGY, AND SN, COLOSTOMY LEAKING, STOMA PINK VIABLE
BUDDED, PERISTOMAL SKIN INTACT, LARGE AMT LIQ DARK BROWN EFFLUENT, PT
SLEEPING, COOPERATIVE, NEW POUCH ADOLFO 2 PIECE HIGH OUTPUT POUCH APPLIED
DUE TO LARGE VOLUME LIQ STOOL, ADAPT RING APPLIED UNDER WAFER, FECAL
 SYSTEM STILL IN PLACE W/ LIQ BROWN STOOL NOTED, WILL CONT TO FOLLOW PRN
 
RECOMMENDATIONS;
ADOLFO CUT TO FIT APPLIANCE, CHANGE 3-5 DAYS AND PRN, EMPTY PRN
STAFF RN AWARE

## 2020-11-11 NOTE — NUR
ASSUMED CARE OF PT AT SHIFT CHANGE. ASSESSMENTS AS CHARTED. MEDS GIVEN PER
MAR. PT ALERT TO SELF AND PLACE. SLEPT MOST OF SHIFT, BUT EASILY AROUSABLE.
TUBE FEEDING STARTED AT 30ML WITH GOAL OF 70. WOUND VAC IN PLACE ON
SACRUM.  NO SIGNS OF BLEEDING. WIFE AT BEDSIDE DURING SHIFT. WILL CONTINUE TO
MONITOR AND FOLLOW POC.

## 2020-11-11 NOTE — NUR
[facility] accepting for [facility]. Faxed updates. Sp with son and wife at bedside to
alert possible dc in am.

## 2020-11-11 NOTE — PATH
[facility]
[address]
[place]   [address]                     PATHOLOGY RPT PROCEDURE       
_______________________________________________________________________________
 
Name:       MYNORANDREW JULIO      Room #:         203-P       ADM IN  
M.R.#:      [id number]     Account #:      [id number]  
Admission:  10/23/20    Date of Birth:  [birth year]  
Discharge:                                              Report #:    [id number]
                                                        Path Case #: [id number]
_______________________________________________________________________________
 
LCA Accession Number: [id number]
.                                                                01
Material submitted:                                        .
PART A: sacrum - SACRAL DECUB TISSUE
PART B: abdomen - OMENTUM
.                                                                01
Clinician provided ICD-10:
K62.5
L89.303
.                                                                01
Clinical history:                                          .
RECTAL HEMORRHAGE
.                                                                02
**********************************************************************
Diagnosis:
A.  Sacral decubitus tissue, debridement:
- Ulceration along with fibrinoid degeneration and marked acute
inflammation extending into subcutaneous tissue including skeletal muscle.
.
B.  Omentum, omentectomy:
- 25.8 cm of omentum tissue showing scattered areas of fat necrosis and
congestion.
(IUV:justyn; 11/11/2020)
QMS  11/11/2020  1459 Local
**********************************************************************
.                                                                02
Electronically signed:                                     .
Alfreda Arguelles MD, Pathologist
NPI- [id number]
.                                                                01
Gross description:                                         .
A.  The specimen is received in formalin, labeled "Andrew Carroll .,
sacral decubitus tissue".  Received are two segments of yellow-tan,
partially necrotic fibroadipose tissue with attached pale tan to severely
necrotic skin measuring 15.8 x 14.9 x 5.1 cm in aggregate dimensions.  The
specimen is submitted representatively in cassette A1.
.
B.  The specimen is received in formalin, labeled "Andrew Carroll ,
omentum".  Received is a segment of bright yellow omentum measuring 25.8 x
18.8 x 1.6 cm in greatest dimensions.  Sectioning reveals bright yellow,
lobulated cut surfaces with no grossly distinct nodules or lesions.  The
specimen is submitted representatively in cassette B1.
(CAA; 11/10/2020)
QA/[facility]  11/10/2020  1753 Local
.                                                                02
Pathologist provided ICD-10:
L89.303, K65.4
 
 
[address]   [address]                     PATHOLOGY RPT PROCEDURE       
_______________________________________________________________________________
 
Name:       MYNORANDREW      Room #:         203-P       ADM IN  
M.R.#:      [id number]     Account #:      [id number]  
Admission:  10/23/20    Date of Birth:  [birth year]  
Discharge:                                              Report #:    [id number]
                                                        Path Case #: [id number]
_______________________________________________________________________________
.                                                                02
CPT                                                        .
029828, [id number]
Specimen Comment: A courtesy copy of this report has been sent to [phone]
[phone]
Specimen Comment: [phone]
Specimen Comment: Report sent to  / DR EPPERSON
***Performed at:  01
   [address], [place]  [id number]
   MD Enmanuel Brandt MD Phone:  [phone]
***Performed at:  02
   [address]  [id number]
   MD Alfreda Arguelles MD Phone:  [phone]

## 2020-11-12 NOTE — NUR
ASSUMED CARE OF PATIENT AT 1900. PATIENT MOVED TO SPECIALTY BED. TUBE FEED
RESIDUALS REMAIN EXTREMELY HIGH THROUGH THE NIGHT. TUBE FEEDING ON HOLD. NOT
PROGRESSING TOWARDS POC GOALS.

## 2020-11-12 NOTE — NUR
DC to [facility] on hold today due to high residuals, TF on hold pending
surgery rounds. [facility] liason updated via phone and fax. They can accept
tomorrow if pt is medically cleared for dc. Will follow.

## 2020-11-12 NOTE — NUR
ASSUMED CARE AT CHANGE OF SHIFT. ALERT X3 WITH FORGETFULNESS. PEG TUB WITH 250
RESIDUAL THIS MORNING. SPOKE TO DR JIMMIE NATION RNP FOR DR NICHOLSON OFFICE
REGARDING RESIDUALS. TUBE FEEDING ON HOLD UNTIL TOMORROW MORING. RESTART
FEEDING AT 20ML IF RESIDUAL LESS 100ML. FOLLOW WATER FLUSH AS RECOMMENDED BY
DIETIAN. PILLS GIVEN VIA PEG TUBE AS ORDERED. WOUND VAC INTACT. TURNED PT Q2H.
WIFE BEDSIDE TODAY. PT DENIES PAIN, DENIES SOB. STAFF TO ANTICIPATE NEEDS.

## 2020-11-13 NOTE — NUR
WOUND CARE F/U;
THE WOUND HAS A SKIN SUBSTITUTE PRODUCT IN THE STAPLED IN WOUND THAT IS NON-
VIABLE.  DR SANDY REQUESTED IT BE REMOVED.  ALL THE STAPLES WERE REMOVED
VERIFIED BY THE RN TODAY WHO IS PRESENT.  THERE IS NO ODOR OF INFECTION IN THE
WOUND.  THE QUALITY OF THE WOUND BED TISSUE IS IMPROVING WITH EACH WOUND VAC
APPLICATION.
 
RECOMMENDATIONS PER DR DU MILLER TO CONTINUE THE WOUND VAC.

## 2020-11-13 NOTE — NUR
assumed pt care at change of shift, pt is awake, alert with some confusion,
meds given as per mar, residual of 230ml noted, fms discontinued, assessments
as charted, wound vac intact, vss, frequent rounding and repositioning, no
needs at this time, will continue to monitor

## 2020-11-13 NOTE — NUR
Case discussd with the care team. Dc to LT[facility] on hold over the weekend. Updates
faxed to [place] at [facility]. Pt with increased residuals and tf on hold.
Surgery following closely. Wife at bedside. Will reassess and update [facility]
on Monday.

## 2020-11-13 NOTE — NUR
ASSUMED CARE AT CHANGE OF SHIFT. ALERT X2, FORGETFUL AND CONFUSED, LETHARGIC
AND AGGITATED. WOUND VAC REPLACE AND REMAINS IN PLACE. RESIDUALS CHECKED WITH
275ML EACH TIME. JIMMIE LEWIS ROUNDED THIS MORNING SEE NOTE. REMAINS OFF
TUBE FEEDING UNTIL RESIDUALS ARE LESS THEN 100MLS. TURNED Q2HR AS TOLERATED.
ORDERS FOR PICC LINE HOWEVER DUE TO DVT IN LEFT ARM PT WILL HAVE TO HAVE IR
PLACE CENTRAL LINE. STAFF TO ANTICIPATE NEEDS. PT CLOSE TO NURSES STATION.

## 2020-11-13 NOTE — O
[facility]
[address]
[place]   [address]                     OPERATIVE REPORT              
_______________________________________________________________________________
 
Name:       STEF LOWE      Room #:         203-P       [facility] [place]  
M.R.#:      [id number]                       Account #:      [id number]  
Admission:  10/23/20    Attend Phys:    Rustam Lycnh MD   
Discharge:              Date of Birth:  [birth year]  
                                                          Report #: [id number]
                                                                    [id number]   
_______________________________________________________________________________
THIS REPORT FOR:  
 
cc:  BASIL - No family physician/PCP 
     BASIL - No family physician/PCP                                        
     Soliman,Mohsin Q. MD FACS                                    ~
CC: Rustam Lynch
    [facility] physician/PCP
 
DATE OF SERVICE:  11/09/2020
 
 
PREOPERATIVE DIAGNOSES:
1.  Severe stage 4 sacral decubitus wound.
2.  Severe protein-calorie malnutrition.
3.  Need for fecal diversion.
 
POSTOPERATIVE DIAGNOSES:
1.  Severe stage 4 sacral decubitus wound.
2.  Severe protein-calorie malnutrition.
3.  Need for fecal diversion.
 
PROCEDURES PERFORMED:
1.  Excisional debridement of skin, subcutaneous tissue, muscle and bone from a
severe necrotic stage 4 sacral decubitus wound ultimately measuring 25 x 25 cm
in dimension (625 square cm).  Preoperative wound measurements were 18 x 16 cm
in dimension with periwound necrosis and significant undermining in all
directions with gross purulence.
2.  Diverting loop transverse colostomy.
3.  Esophagogastroduodenoscopy (EGD) with placement of a percutaneous endoscopic
gastrostomy (PEG) tube.
 
SURGEON:  Mohsin Soliman, MD
 
ASSISTANT:  MO Campo.
 
ANESTHESIA:  General endotracheal anesthesia.
 
ESTIMATED BLOOD LOSS:  Minimal (less than 10 mL).
 
COMPLICATIONS:  None appreciated.
 
SPECIMENS:  All debrided tissue to pathology.
 
INDICATIONS:  The patient is a 75-year-old  male who was initially
hospitalized for a bleeding rectal ulcer and progressive decline in functional
status, who underwent debridement of a stage 4 sacral decubitus wound on 2 prior
 
 
 
[address]   [address]                     OPERATIVE REPORT              
_______________________________________________________________________________
 
Name:       STEF LOWE      Room #:         203-P       [facility] [place]  
..#:      [id number]                       Account #:      [id number]  
Admission:  10/23/20    Attend Phys:    Rustam Lynch MD   
Discharge:              Date of Birth:  [birth year]  
                                                          Report #: [id number]
                                                                    [id number]   
_______________________________________________________________________________
occasions.  I was asked to evaluate as a second opinion and upon evaluation, the
patient had ongoing grossly necrotic tissue that was chronically focally
contaminated and he has severe protein-calorie malnutrition.  After thorough
consultation with the patient and his wife as well his Wound Care service and
other providers involved in his care, indication was for the above-mentioned
procedures today.
 
DESCRIPTION OF PROCEDURE:  After explaining the risks, benefits and
alternatives
of the procedure with the patient in detail in the preoperative holding area
and
obtaining written consent, the patient was brought to the operating room and
placed supine on his hospital bed  After conducting a thorough timeout
procedure
verifying correct patient and procedure, the patient was given general
endotracheal anesthesia.  Once adequate anesthesia was obtained, his SCDs were
hooked up to pneumatic compression device.  He was given a preoperative dose of
antibiotics in line with the SCIP protocol as he is already on an inpatient
regimen of IV antibiotic therapy.  The patient was now positioned on the
operating room table in the prone position with all pressure points
appropriately padded and his sacral wound was prepped and draped in standard
surgical sterile fashion.  Electrocautery was now used to debride all nonviable
skin, subcutaneous tissue and muscle from the periphery of the wound, carried
down to the bed of the wound where we arrived upon the sacral bone.  The bone
itself was extremely spongy and soft which appeared to be grossly infected and
as such, rongeurs were used to debride this back to healthy bleeding firm bone.
 
Hemostasis was assured with electrocautery and the bone was passed off the
field
for microbiologic analysis.  The [employer] ultrasonic debridement tool was now
used to remove all remaining nonviable tissue and biofilm from the entirety of
the wound, bringing this wound down to a very healthy vascularized bed
throughout.  Hemostasis was assured with electrocautery.  The wound was
irrigated.  I then proceeded to utilize PriMatrix Ag sheaths, which were then
placed in the bed of the wound, stapled around the periphery and anchored in
the
mid portion with 3-0 Vicryl sutures as an extracellular matrix scaffold to
hopefully allow for tissue regeneration. Three 1.5mL vials of Interfyl were
then applied under the PriMatrix grafts and the wound was then dressed with
sterile saline-soaked Kerlix gauze, ABDs and Medipore tape.  The patient was
now
positioned in the supine position on the operating room table where the abdomen
was prepped and draped in the standard surgical sterile fashion.  I selected a
site midway between the umbilicus and the xiphoid just off to the right of
midline where a #10 bladed scalpel was used to create a 2 cm vertical incision.
 
 
 
 
[address]   [address]                     OPERATIVE REPORT              
_______________________________________________________________________________
 
Name:       STEF LOWE      Room #:         203-P       [facility] [place]  
M.R.#:      [id number]                       Account #:      [id number]  
Admission:  10/23/20    Attend Phys:    Rustam Lynch MD   
Discharge:              Date of Birth:  [birth year]  
                                                          Report #: [id number]
                                                                    [id number]   
_______________________________________________________________________________
Electrocautery was used to carry this down through skin and subcutaneous
tissues
to ensure hemostasis until I arrived upon the anterior fascia.  The fascia was
scored vertically as well, revealing the right rectus muscle posteriorly.  The
fibers of the rectus muscle were spread laterally with a hemostat and the
posterior rectus sheath was grasped with a hemostat and elevated.  I then
opened
the posterior rectus sheath between hemostats using Metzenbaum scissors and
placed a finger in the abdomen to ensure no injury to the underlying structures
as I opened the entirety of the posterior sheath with electrocautery.  The
proximal transverse colon was seen to reside immediately intraabdominally
behind
the incision site.  This was elevated with a [place] clamp.  The antimesenteric
aspect along the tinea was identified and a small colotomy was made with
electrocautery.  A hemostat was placed in the colotomy and I extended this
approximately 2 cm longitudinally along the tinea on the antimesenteric aspect.
 
This was then anchored to the skin using 4 sutures of 3-0 Vicryl at the 12, 3,
6, and 9 o'clock positions grabbing full thickness bites of the colon as well
as
an anchoring seromuscular bite down deep and affixing it to the dermis.  This
allowed for a slight rosebud appearance in a Hillary fashion.  I then anchored
the mucocutaneous juncture in each root of the resultant 4 quadrants using
short
runs of 3-0 Vicryl in standard running fashion, taking full thickness bites of
colon to the dermis.  Digital finger intubation of both afferent and efferent
limbs showed them to be patent to a subfascial level.  Sterile colostomy
appliance was then applied.  I then changed gloves and utilizing the EGD scope
was able to intubate the oropharynx, traversed down to the stomach with ease. 
The stomach was fully insufflated and the lights were turned down and I had
easy
transillumination through the abdominal wall in the left upper quadrant. 
Manual
external ballottement identified and confirmed the position and at this
location, a small skin nick was made with a #11 bladed scalpel through which
the
needle-sheath apparatus was directed.  This was seen to easily penetrate
through
the anterior gastric wall endoscopically.  The needle was then removed and a
wire was placed down the sheath, which was then grasped using a loop snare down
the EGD scope.  The scope was then removed via the oropharynx, bringing the
wire
through with it and the 20-German pull-type PEG tube was affixed to the wire. 
The wire was then pulled through the abdominal wall, bringing the PEG tube down
the oropharynx and through the anterior gastric and anterior abdominal walls. 
The EGD scope was placed back into the gastric lumen where the internal flange
 
 
 
[address]   [address]                     OPERATIVE REPORT              
_______________________________________________________________________________
 
Name:       STEF LOWE      Room #:         203-P       [facility] [place]  
.R.#:      [id number]                       Account #:      [id number]  
Admission:  10/23/20    Attend Phys:    Rustam Lynch MD   
Discharge:              Date of Birth:  [birth year]  
                                                          Report #: [id number]
                                                                    [id number]   
_______________________________________________________________________________
was seen to reside against the gastric wall without undue tightness to prevent
erosion.  The external flange, clamp and end adaptor were then applied to the
PEG tube under direct vision and the stomach was fully desufflated with the EGD
scope before removal of the EGD scope.  This completed the procedure without
incident.  At the end of the procedure, all instrument, needle and sponge
counts
were correct.  The patient tolerated the procedure without incident, was
awakened in the operating room, transitioned to the recovery room in stable
condition with no apparent complications.
 
 
 
 
 
 
 
 
 
 
 
 
 
 
 
 
 
 
 
 
 
 
 
 
 
 
 
 
 
 
 
 
 
 
 
  <ELECTRONICALLY SIGNED>
   By: Mohsin Q. Soliman, MD, FACS   
  11/13/20     1050
D: 11/12/20 1454                           _____________________________________
T: 11/12/20 1509                           Mohsin Q. Soliman, MD, FACS     /nt

## 2020-11-14 NOTE — NUR
assumed pt care at the chnage of shift,pt is lethargic, follow commands and
irritable, afib on the motor, assessments as charted, meds given as per mar,
tf remains off residual >400, updated np about pt, no new orders noted, blood
sugar stable; 725ml out of linares this am, will continue to monitor

## 2020-11-14 NOTE — NUR
PT ALERT TO SELF ONLY, AND DOES NOT FOLLOW ANY COMMANDS. VSS, IVF INFUSING PER
ORDER. WOUND VAC TO SACRAL WOUND INTACT. CASTRO TO DD. PT DOES NOT APPEAR CHUCK
IN ANY PAIN. WILL CONTINUE TO MONITOR.

## 2020-11-15 NOTE — NUR
AXOX2. VSS. NO S/S ACUTE DISTRESS NOTED OR REPORTED AT THIS TIME. WILL CONT TO
MONITOR FOR ANY CHANGES IN CONDITION.

## 2020-11-15 NOTE — CRIT
[facility]
[address]
[place]   [address]                     CRITICAL CARE NOTE            
_______________________________________________________________________________
 
Name:       MYNORSTEF JULIO      Room #:         203-P       [facility] [place]  
M.R.#:      [id number]                       Account #:      [id number]  
Admission:  10/23/20    Attend Phys:    Rustam Lynch MD   
Discharge:              Date of Birth:  [birth year]  
                                                          Report #: [id number]
                                                                    [id number]   
_______________________________________________________________________________
THIS REPORT FOR:   //name//                          
 
CC: Rustam Lynch
    [facility] physician/PCP
 
HISTORY OF PRESENT ILLNESS:  This is a 75-year-old demented patient.  We have
been asked to see for further evaluation of high residuals per gastrostomy tube.
 He has had a prolonged hospitalization.  We saw him before for fecal impaction
and a rectal ulcer associated with the impaction.
 
MEDICAL HISTORY:  Notable for rectal ulcer bleeding, dementia, coronary artery
disease, coronary artery bypass, metabolic encephalopathy, UTI, hypertension,
diabetes and recurrent falls.
 
MEDICATIONS:  Listed in the chart and allergies as well.
 
FAMILY HISTORY AND SOCIAL HISTORY:  Noncontributory.
 
REVIEW OF SYSTEMS:  Not possible.
 
PERTINENT PHYSICAL EXAM FINDINGS:
GENERAL:  Afebrile.
VITAL SIGNS:  Stable.
NECK:  No significant abnormalities.
CARDIOVASCULAR AND PULMONARY:  Not performed.
ABDOMEN:  Anterior abdominal examination revealed an intact gastrostomy tube and
no evidence of induration or erythema or drainage.  The tube bolster was rotated
360 degrees without difficulty.
 
Pertinent lab and x-ray were reviewed.
 
ASSESSMENT AND PLAN:  In summary, the patient has had significant gastric
retention of feeding tube with high residuals noted over the course of the last
12 hours or so.  He is on Reglan 5 mg a.c. and at bedtime.  There is no mention
in history of diabetic gastroparesis; however, this is a possibility and
combined with being that fast as well as electrolyte abnormalities and acute
morbidity of his gastric transit likely significantly impaired.  He has no
evidence of obstruction or PEG malfunction.  My suggestion would be to hold PEG
feeding for 24 hours.  Correct electrolyte abnormalities and avoid narcotic pain
medications if possible.  I would increase his Reglan to 10 mg a.c. and at
bedtime.  Check residuals every 6 hours and start at 20 mL and advance to goal
rate.  We will follow concurrently.
 
 
 
[address]   [address]                     CRITICAL CARE NOTE            
_______________________________________________________________________________
 
Name:       STEF LOWE      Room #:         203-P       [facility] [place]  
.R.#:      [id number]                       Account #:      [id number]  
Admission:  10/23/20    Attend Phys:    Rustam Lynch MD   
Discharge:              Date of Birth:  [birth year]  
                                                          Report #: [id number]
                                                                    [id number]   
_______________________________________________________________________________
 
Thanks for allowing us to participate in his care.
 
 
 
 
 
 
 
 
 
 
 
 
 
 
 
 
 
 
 
 
 
 
 
 
 
 
 
 
 
 
 
 
 
 
 
 
 
 
 
 
 
 
  <ELECTRONICALLY SIGNED>
   By: Luciano Fleming MD           
  11/15/20     1015
D: 11/14/20 1236                           _____________________________________
T: 11/15/20 0610                           Luciano Fleming MD             /nt

## 2020-11-15 NOTE — NUR
PT CARE ASSUMED AT 0700. ASSESSMENTS AS CHARTED. MEDICATION AS CHARTED. ROBERT
IV. RFA. IV. CASTRO. COLOSTOMY TO DEPENDENT DRAINAGE. WOUND VAC; MWF. TUBE
FEEDING; GLUCERNA 1.2, 50 ML; FLUSH Q6 250 ML. D/C TO [facility] ONCE TOLERATING
PEG.

## 2020-11-16 NOTE — O
[facility]
[address]
[place]   [address]                     OPERATIVE REPORT              
_______________________________________________________________________________
 
Name:       STEF LOWE      Room #:         203-P       [facility] [place]  
M.R.#:      [id number]                       Account #:      [id number]  
Admission:  10/23/20    Attend Phys:    Rustam Lynch MD   
Discharge:              Date of Birth:  [birth year]  
                                                          Report #: [id number]
                                                                    [id number]   
_______________________________________________________________________________
THIS REPORT FOR:  
 
cc:  BASIL - No family physician/PCP 
     BASIL - No family physician/PCP                                        
     Sridhar Renteria MD                                     ~
CC: Rustam GRACIA physician/PCP
 
DATE OF SERVICE:  10/23/2020
 
 
PREOPERATIVE DIAGNOSIS:  Bleeding rectal ulceration.
 
POSTOPERATIVE DIAGNOSIS:  Bleeding rectal ulceration.
 
OPERATION:  Rectal exam under anesthesia with oversewing of bleeding ulceration.
 
SURGEON:  Sridhar Renteria MD
 
ANESTHESIA:  General.
 
ESTIMATED BLOOD LOSS:  30 mL.
 
SPECIMEN:  None.
 
DESCRIPTION OF PROCEDURE:  After informed consent was obtained, the patient was
brought to the operating room and placed supine.  SCDs were placed and working
and general anesthesia was induced.  The patient was then placed in the
lithotomy position.  The area was then prepped and draped in the usual sterile
fashion with Betadine.
 
Digital rectal exam was performed.  There were hard stool balls, which I
disimpacted.  I then inserted a bivalve speculum.  This demonstrated a small
ulceration in the left posterior quadrant approximately at the level of the
dentate line.  This was the source of the bleeding.  I then oversewed this area
with 3-0 Vicryl sutures.  This controlled the bleeding nicely.  I then irrigated
the area.  There were no other areas of bleeding.  The area was then packed with
Surgicel and Gelfoam.
 
COMPLICATIONS:  None.
 
DISPOSITION:  The patient was taken to recovery in satisfactory condition.
 
 
  <ELECTRONICALLY SIGNED>
   By: Sridhar Renteria MD     
  11/16/20     1229
D: 10/23/20 1848                           _____________________________________
T: 10/23/20 2027                           Sridhar Renteria MD       /nt 21-Jul-2017 22:57

## 2020-11-16 NOTE — NUR
PT LYING IN BED.  DENIES PAIN.  COLOSTOMY BAG RELACED--PT REMOVED.  TEMPORALLY
PUT TUBE FEEDING ON HOLD AFTER 400ML RESIDUAL.  FREQUENT OBSERVATION.

## 2020-11-16 NOTE — NUR
ASSESSMENT AS CHARTED. PT ALERT TO SELF. WIFE AT THE BEDSIDE. SEEN BY DR. BATES. ORDERS GIVEN TO DISCHARGE PT TO [facility]. WOUND VAC D/C. WIFE
NOTIFIED.

## 2020-11-16 NOTE — O
[facility]
Jonah Agarwal
[place]   [address]                     OPERATIVE REPORT              
_______________________________________________________________________________
 
Name:       STEF LOWE      Room #:         203-P       ADM IN  
M.R.#:      [id number]                       Account #:      [id number]  
Admission:  10/23/20    Attend Phys:    Rustam Lynch MD   
Discharge:              Date of Birth:  [birth year]  
                                                          Report #: [id number]
                                                                    [id number]   
_______________________________________________________________________________
THIS REPORT FOR:  
 
cc:  BASIL - No family physician/PCP 
     BASIL - No family physician/PCP                                        
     Sridhar Renteria MD                                     ~
CC: Rustam GRACIA physician/PCP
 
DATE OF SERVICE:  10/30/2020
 
 
PREOPERATIVE DIAGNOSIS:  Necrotizing ulceration of the sacrum.
 
POSTOPERATIVE DIAGNOSIS:  Necrotizing ulceration of the sacrum.
 
OPERATION:  Debridement of skin, muscle and fascia and perineum (36728). 
 
SURGEON:  Sridhar Renteria MD
 
ANESTHESIA:  General.
 
ESTIMATED BLOOD LOSS:  Minimal.
 
SPECIMEN:  None.
 
DESCRIPTION OF PROCEDURE:  After informed consent was obtained, the patient was
brought to the operating room and placed supine.  SCDs were placed and working,
general anesthesia was induced.  The patient was placed in the right lateral
decubitus position.
 
The area was then prepped and draped in the usual sterile fashion.  This was an
excisional debridement.  Depth was down through the muscle.  There was still
some necrotic tissue at the inferior aspect of the wound.  This was sharply
debrided away with cautery.  Other areas of necrosis were sharply debrided so
that by the time it was done, there was only about 5% of necrotic areas in the
wound, the other 95% was very healthy normal tissue.  The post-debridement wound
area was approximately 10 x 10 cm.  The area was then copiously irrigated with
normal saline.  It was packed with sterile gauze.  Sterile dressings were
applied.
 
COMPLICATIONS:  None.
 
 
 
[facility]
[address] [place]   [address]                     OPERATIVE REPORT              
_______________________________________________________________________________
 
Name:       STEF LOWE      Room #:         203-P       [facility] [place]  
[facility]#:      [id number]                       Account #:      [id number]  
Admission:  10/23/20    Attend Phys:    Rustam Lynch MD   
Discharge:              Date of Birth:  [birth year]  
                                                          Report #: [id number]
                                                                    [id number]   
_______________________________________________________________________________
 
DISPOSITION:  The patient was taken to recovery in satisfactory condition.
 
 
 
 
 
 
 
 
 
 
 
 
 
 
 
 
 
 
 
 
 
 
 
 
 
 
 
 
 
 
 
 
 
 
 
 
 
 
 
 
 
 
  <ELECTRONICALLY SIGNED>
   By: Sridhar Renteria MD     
  11/16/20     1229
D: 10/30/20 1228                           _____________________________________
T: 10/30/20 1253                           Sridhar Renteria MD       /nt

## 2020-11-16 NOTE — NUR
OSTOMY CARE NOTE;
AWAKE, COOPERATIVE, STILL SOME CONFUSION PRESENT, WIFE AT BS, POUCH LEAKING,
LIQ BROWN STOOL NOTED, STOMA REDDISH/PINK VIABLE, PERISTOMAL SKIN INTACT,
CHANGED USING 2 PIECE HIGH OUTPUT ADOLFO POUCH W/ ADAPT RING UNDER WAFER,
CONNECTED TO DEP DRAINAGE, SUPPLIES AND INFO AT BS
 
RECOMMENDATIONS;
CONT HIGH OUTPUT POUCH AS LONG AS STOOL SO LIQ, CHANGE Q3-4DAYS AND PRN
STAFF RN AWARE

## 2020-11-16 NOTE — NUR
Patient stable to dc to [facility]. Faxed orders and confirmed rec. [facility] for
1300. Chart copied. Notified son, wife, and RN of dc time. No further needs

## 2020-11-16 NOTE — O
[facility]
Jonah Agarwal
[place]   [address]                     OPERATIVE REPORT              
_______________________________________________________________________________
 
Name:       STEF LOWE      Room #:         203-P       ADM IN  
M.R.#:      [id number]                       Account #:      [id number]  
Admission:  10/23/20    Attend Phys:    Rustam Lynch MD   
Discharge:              Date of Birth:  [birth year]  
                                                          Report #: [id number]
                                                                    [id number]   
_______________________________________________________________________________
THIS REPORT FOR:  
 
cc:  BASIL - No family physician/PCP 
     BASIL - No family physician/PCP                                        
     Sridhar Renteria MD                                     ~
CC: Rustam Lynch
    [facility] physician/PCP
 
DATE OF SERVICE:  10/27/2020
 
 
PREOPERATIVE DIAGNOSIS:  Sacral pressure ulcer.
 
POSTOPERATIVE DIAGNOSIS:  Necrotizing infection of the sacrum, buttock and
perineum.
 
OPERATION:  Debridement of skin, subcutaneous tissue, muscle and fascia for
necrotizing soft tissue infection; external genitalia, perineum and abdominal
wall without fascial closure (43940).
 
SURGEON:  Sridhar Renteria MD
 
ANESTHESIA:  General.
 
ESTIMATED BLOOD LOSS:  Minimal.
 
SPECIMENS:
1.  Tissue for culture.
2.  Bone for culture.
 
DESCRIPTION OF PROCEDURE:  After informed consent was obtained, the patient was
brought to the operating room and placed supine.  SCDs were placed and working,
preoperative antibiotics were administered, general anesthesia was induced.  The
patient was placed in the right lateral decubitus position with an axillary roll
and all bony prominences protected.  The sacrum and buttock were prepped and
draped in the usual sterile fashion.  This was an excisional debridement.  I
first began by grasping the frankly necrotic tissue and excising it with
cautery.  I had to debride away skin and the necrotic tissue.  The
post-debridement wound area was 8 x 11 x 2 cm.  I got around all sides to get to
healthy tissue and remove the green and black necrotic tissue.  This extended
down towards his anus and perineal area.  Once this had been done, there was
exposed sacrum and I took a bone biopsy with a rongeur.  The area was then
irrigated. A 100% of the wound was debrided.  It was then packed with sterile
gauze.  Sterile dressings were applied.
 
COMPLICATIONS:  None.
 
 
 
[facility]
[address] [place]   [address]                     OPERATIVE REPORT              
_______________________________________________________________________________
 
Name:       STEF LOWE [facility]     Room #:         203-P       [facility] [place]  
M.R.#:      [id number]                       Account #:      [id number]  
Admission:  10/23/20    Attend Phys:    Rustam Lynch MD   
Discharge:              Date of Birth:  [birth year]  
                                                          Report #: [id number]
                                                                    [id number]   
_______________________________________________________________________________
 
DISPOSITION:  The patient was taken to recovery in satisfactory condition.
 
 
 
 
 
 
 
 
 
 
 
 
 
 
 
 
 
 
 
 
 
 
 
 
 
 
 
 
 
 
 
 
 
 
 
 
 
 
 
 
 
 
  <ELECTRONICALLY SIGNED>
   By: Sridhar Renteria MD     
  11/16/20     1229
D: 10/27/20 1453                           _____________________________________
T: 10/27/20 1514                           Sridhar Renteria MD       /nt

## 2020-12-09 ENCOUNTER — HOSPITAL ENCOUNTER (EMERGENCY)
Dept: HOSPITAL 35 - ER | Age: 75
Discharge: NURSING, REHAB OR LONG-TERM CARE | End: 2020-12-09
Payer: COMMERCIAL

## 2020-12-09 VITALS — BODY MASS INDEX: 21.22 KG/M2 | HEIGHT: 68 IN | WEIGHT: 139.99 LBS

## 2020-12-09 VITALS — SYSTOLIC BLOOD PRESSURE: 87 MMHG | DIASTOLIC BLOOD PRESSURE: 52 MMHG

## 2020-12-09 DIAGNOSIS — Z88.8: ICD-10-CM

## 2020-12-09 DIAGNOSIS — I10: ICD-10-CM

## 2020-12-09 DIAGNOSIS — Z43.1: Primary | ICD-10-CM

## 2020-12-09 DIAGNOSIS — E78.5: ICD-10-CM

## 2020-12-09 DIAGNOSIS — E11.9: ICD-10-CM

## 2020-12-09 DIAGNOSIS — Z79.899: ICD-10-CM

## 2020-12-11 NOTE — NUR
1700
Admitted from ER due to PEG tube replacement, transferred to room safely.
Confused. On telemetry, no complains and signs of chest pain, crushing
sensation and heaviness. Admission history and education done; admission forms
to be signed- pt confused and no relative available at bedside. Admission
assessment, wound photos and rest of admission to be done by night RN. On
nothing per orem; pt informed, for ST evaluation and for PEG replacement
tomorrow AM. On O2 at 1lpm via nasal cannula. On blood sugar monitoring, taken
and recorded accordingly; with sliding scale insulin ordered. With R AC SL- NS
at 75cc/hr started as prescribed; with midline at R upper(pt came with it as
per ER RN. With webb in place- pt also came with it; draining well; output
measured and recorded accordingly.
Called pt's son Nikolay to inform re: admission and update given as well.
Pt with lamar from ER as reported; orders made at 12pm- no forms or
interventions done; verified with house supervisor- new orders to be obtained,
intervention to be filled up and monitoring to be done every 2 hrs; CARLOS Heard
informed, called back unit and orders obtained- Dr Hodges informed re: this as
well, called pt's son Nikolay to obtain permission re: lamar- he is aware
and agreed. Followed up with House supervisor Santa re: forms to be signed as
well as intervention for restraints- signed.
Falls bundle in place. Pt with sacral wounds- dressing and photo to be done by
night RN.
To continue monitoring patient.

## 2020-12-12 NOTE — NUR
ORDERS RECEIVED FOR CLINICAL BEDSIDE SWALLOW EVALUATION ON 12/12/20; HOWEVER
UPON CONSULTATION W/GI - DR PEDRO & PCP - DR SILKE DELVALLE, PO INTAKE IS NOT
DEEMED APPROPRIATE AT THIS TIME D/T NEWLY REPLACED PEG TUBE AND COMPLEXITY OF
OTHER UNDERLYING MEDICAL ISSUES. SLP TO SIGN OFF AS SKILLED ST SERVICES ARE
NOT CLINICALLY INDICATED. PLEASE RECONSULT IF NEEDED.

## 2020-12-12 NOTE — NUR
ASSUMED CARE OF PT AT 1900HRS. PT AOX1 AND NEEDS MUST BE ANTICIPATED. FALL
PRECAUTION IN PLACE. PT TURNED Q2-3H. PT IS CONFUSED AND DELUSIONAL. WOUND
PICS TAKEN. OSTOMY BAG CHANGED. PT PLACED NPO AT MN FOR PROCEDURE IN THE AM.
PT HAD A FSBG OF 56 AT 0600, D10 GIVEN. PT WAS ABLE TO GET COMFORTABLE AND
SLEEP PART OF THE SHIFT. REPORT GIVEN TO AM RN.

## 2020-12-12 NOTE — HC
[facility]
Mauri Briggs
[place]   [address]                     CONSULTATION                  
_______________________________________________________________________________
 
Name:       IRIS HAND      Room #:         453-P       ADM IN  
M.R.#:      [id number]                       Account #:      [id number]  
Admission:  12/11/20    Attend Phys:    David Hodges MD      
Discharge:              Date of Birth:  [birth year]  
                                                          Report #: [id number]
                                                                    [id number]   
_______________________________________________________________________________
THIS REPORT FOR:  
 
cc:  FAM - No family physician/PCP 
     FAM - No family physician/PCP                                        
     Bala Baeza MD                                          ~
 
DATE OF SERVICE:  12/12/2020
 
 
WOUND CARE CONSULTATION
 
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
status post debridement.  The patient readmitted for having removed his PEG
tube.
 
HISTORY OF PRESENT ILLNESS:  The patient is a 75-year-old gentleman with recent
encephalopathy and dementia, who is status post 11/01 and 11/09 surgical
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
requiring extensive debridement, diverting colostomy, and a PEG tube.  He was
transferred from [facility] where he had self-dislodged his PEG tube in the setting
of dementia.  The patient suffers from severe protein-calorie malnutrition with
albumin of 2.0, requires a PEG tube for feedings.  Wound care is consulted due
to his large sacral wound.
 
PAST MEDICAL HISTORY:  Hypertension, hyperlipidemia, diabetes mellitus, coronary
artery disease, dementia, toxic encephalopathy, history of recent
gastrointestinal bleed from rectal ulcer, history of recent sacral pressure sore
with osteomyelitis.
 
PAST SURGICAL HISTORY:  Coronary artery bypass grafting with stent; sacral
debridement; 11/01 and 11/09 diverting colostomy, 11/09 of this year.
 
ALLERGIES:  ZIPRASIDONE.
 
LABORATORY DATA:  Albumin 2.0.  White blood count 8.2, hemoglobin 9.2,
hematocrit 28.4.
 
MEDICATIONS:  See chart.
 
PHYSICAL EXAMINATION:
GENERAL:  Shows a chronically ill-appearing elderly gentleman with dementia.
HEENT:  Mucous membranes are moist.
NECK:  Supple.
ABDOMEN:  Shows the presence of a colostomy, which is pouched and functioning. 
He has a PEG tube site in the left upper abdomen where the PEG tube has been
removed.  There is no active drainage from the site.
 
 
 
[address]   [address]                     CONSULTATION                  
_______________________________________________________________________________
 
Name:       IRIS HAND [facility]     Room #:         453-P       ADM IN  
M.R.#:      [id number]                       Account #:      [id number]  
Admission:  12/11/20    Attend Phys:    David Hodges MD      
Discharge:              Date of Birth:  [birth year]  
                                                          Report #: [id number]
                                                                    [id number]   
_______________________________________________________________________________
 
EXTREMITIES:  No lower extremity wounds.  Examination of the patient's back
shows a 2 x 2 cm superficial stage 2 pressure ulceration with maceration of the
posterior scrotum.  Examination of the patient's back reveals a huge sacral
wound, status post debridement.  Wound was very clean, measures approximately 15
x 12 cm.  There is healthy subcutaneous tissue in the wound, but a little sign
of granulation, some exposed bone at the wound base, entire wound is clean. 
Wound extends from the upper sacrum to close to the anus.  Wound was very clean
packed with quarter-strength Dakin's gauze.
 
IMPRESSION:
1.  Dementia.
2.  Immobility.
3.  Diabetes mellitus type 2 with skin ulcer.
4.  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
debridement and diverting colostomy.
5.  PEG tube dislodgement in the setting of severe protein-calorie malnutrition,
albumin 2.0.
6.  Encephalopathy.
 
PLAN:  I have placed orders for quarter-strength Dakin's packing of the sacral
wound to be done twice daily.  On Monday, wound can be assessed for negative
pressure wound therapy with wound VAC.  Wound is in a difficult location near
the anus; however, this may be possible in the setting of a diverting colostomy.
 The patient has an ulcer to the posterior scrotum.  We treated with barrier
cream twice daily.  Offload with low air loss mattress, maximize nutrition with
reestablished PEG tube feedings.  Continue IV antibiotics for sacral
osteomyelitis.  Wound care team will follow.
 
 
 
 
 
 
 
 
 
 
 
 
 
 
 
 
  <ELECTRONICALLY SIGNED>
   By: Bala Baeza MD          
  12/12/20     0630
D: 12/12/20 0525                           _____________________________________
T: 12/12/20 0629                           Bala Baeza MD            /nt

## 2020-12-12 NOTE — NUR
Received awake on bed. Due medications given as prescribed, able to swallow
meds w/o difficulty. On room air. Vital signs stable. On telemetry; no
complains and signs of chest pain, crushing sensation and heaviness. On
nothing per orem until evaluated by ST; ST came this AM- spoke with Dr Duke and Dr Jones, to keep on NPO- mouth care done. On blood sugar
monitoring, taken and recorded accordingly.
With colostomy in place- output measured and recorded accordingly. With webb
in place, output measured and recorded accordingly; draining well.
With NS at 75cc/hr, infusing well at R AC; with midline at L upper arm- intact
and flushing well, seen by IV nurse today.
With mittens on bilateral hands- monitored every 2 hrs; changed today- soiled.
Pt seen and examined by Dr Baeza early this AM, sacral dressing changed.
Turned on his sides regulary.
Pt brought down this AM for PEG replacement via bed; report given to pre-op
nurse. Tolerated procedure well. Back to room safely, as per Dr Duke- ok
to use PEG tube this afternoon. Wife visited this afternoon- update given.
To continue monitoring patient.

## 2020-12-13 NOTE — P
[facility]
Mauri Briggs
[place]   [address]                     PROCEDURE REPORT              
_______________________________________________________________________________
 
Name:       IRIS HAND      Room #:         453-P       ADM IN  
M.R.#:      [id number]                       Account #:      [id number]  
Admission:  12/11/20    Attend Phys:    David Hodges MD      
Discharge:              Date of Birth:  [birth year]  
                                                          Report #: [id number]
                                                                    [id number]   
_______________________________________________________________________________
THIS REPORT FOR:  
 
cc:  FAM - No family physician/PCP 
     FAM - No family physician/PCP                                        
     Mayito Samuel MD                                   ~
 
DATE OF SERVICE:  12/12/2020
 
 
PROCEDURE PERFORMED:  Upper endoscopy with PEG tube placement.
 
HISTORY OF PRESENT ILLNESS:  The patient is a 75-year-old male with a history of
dementia, had a PEG tube placed on 11/09/2020 by Dr. Ball at the time of his
diverting colostomy as he has had chronic decubitus ulcers.  PEG tube was
dislodged and the patient was sent back for further evaluation.  The PEG tube is
out, the fistula is closing.  The plan is for repeat upper endoscopy with new
PEG tube placement.  The patient had been on Eliquis, this has been held for the
last 2 days.  The patient is unable to give any history due to his significant
dementia.
 
PROCEDURE:  The risks and benefits of the procedure were explained to the
patient's DPOA, those risks including but not limited to bleeding, perforation,
the risk of sedation as well as the potential risk of infection.  They
understood these risks and gave informed consent.  Sedation was given using
propofol per Anesthesia.  The patient was given 2 grams of Ancef prior to the
procedure.  Next, using a standard Olympus upper endoscope, the scope was placed
in the patient's mouth and advanced under direct vision through the esophagus,
stomach and into the second portion of the duodenum.  The esophagus was normal
throughout.  The GE junction was normal.  In the stomach near the fundus, it
appears the previous PEG tube site was noted.  There was no evidence of
bleeding.  The remaining gastric mucosa was normal.  The pylorus was normal and
patent.  In the duodenal bulb, a few small superficial ulcers were noted.  No
evidence of bleeding.  The first and second portion of the duodenum were normal.
 The scope was then brought back up to the patient's stomach and the stomach was
insufflated with air.  Good transillumination was noted through the anterior
abdominal wall.  This was in the mid body of the stomach.  Next, the skin was
marked and then the skin was prepped with chlorhexidine solution.  Sterile drape
was then put in place.  Next, Xylocaine was used as a local anesthetic.  Next,
using a seeker needle, the needle was advanced through the anterior abdominal
wall under direct vision into the gastric lumen without difficulty.  The needle
was then removed and a 1 cm transverse incision was made through the skin. 
Next, a catheter needle was then advanced through the mid portion of the
incision again into the gastric lumen under direct vision.  The needle was
removed leaving the catheter in place.  A blue guidewire was then inserted
through the catheter and this was grasped with a snare through the endoscope and
then brought back up into the patient's mouth.  Next, a 20-Bengali PEG tube was
 
 
 
[address]   [address]                     PROCEDURE REPORT              
_______________________________________________________________________________
 
Name:       IRIS HAND      Room #:         453-P       ADM IN  
M.R.#:      [id number]                       Account #:      [id number]  
Admission:  12/11/20    Attend Phys:    David Hodges MD      
Discharge:              Date of Birth:  [birth year]  
                                                          Report #: [id number]
                                                                    [id number]   
_______________________________________________________________________________
 
then secured to the blue guidewire and using a pull technique, was put into
place without difficulty.  The scope was reintroduced into the patient's
stomach, the PEG tube was noted to be in good position in the mid to distal body
of the stomach.  No evidence of bleeding.  The scope was then withdrawn and the
PEG tube was secured to the anterior abdominal wall.  The procedure was
terminated.  The patient tolerated the procedure well.
 
IMPRESSION:
1.  Previous PEG tube site noted in the gastric fundus.
2.  Superficial ulcerations in the duodenal bulb.  No evidence of bleeding.
3.  Successful placement of new PEG tube as described above.
 
RECOMMENDATIONS:
1.  Okay to start using PEG tube later today.
2.  Would recommend daily PPI therapy for duodenal ulcers.
 
Thank you for allowing me to participate in his care.
 
 
 
 
 
 
 
 
 
 
 
 
 
 
 
 
 
 
 
 
 
 
 
 
 
 
  <ELECTRONICALLY SIGNED>
   By: Mayito Samuel MD   
  12/13/20     1429
D: 12/12/20 0942                           _____________________________________
T: 12/12/20 1919                           Mayito Samuel MD     /nt

## 2020-12-13 NOTE — NUR
Assumed pt care at 7am.Pt in bed sleeping on and off with mitten restraints on
bilaterally to prevent pt pulling peg tube.At rpoessessment completed.vss.
Repositioned pt in bed q2h for comfort.Dr Lobato here,order noted.Pt wife has
been sitting at bs since 10am till shift change.Pt c/o soa but o2 sat on room
was 100% but to satisfy pt,he was placed on 1liter nc.Will continue to
monitor.

## 2020-12-13 NOTE — NUR
PT CARE ASSUMED WITH PT IN BED SLEEPING.PT IS ALERT AND CONFUSE.PT IS NPO FOR
SPEECH THERAPY TO EVALUATE.PT HAS A PEG TUBE WITH GLUCERNA 1.2 AT 30ML.HR FOR
8HR AND NOW AT 40ML/HR TO REACH THE GOAL OF 70ML/HR ADDING 10ML/HR AFTER 8HRS
AS TOLERATED BY PT.PT HAS COLOSTOMY BAG.PT IS ACCUCHECK Q6H.PT IV ACCESS RT AC
 AND TONIA MIDLINE.PT IS ON MITTEN RESTRAINT TO RENEW EVERY 12 HRS PER DOCTOR
ORDER.WILL CONTINUE TO MONITOR PER POC

## 2020-12-14 NOTE — NUR
OSTOMY CARE NOTE;
AWAKE, CONFUSED, COOPERATIVE, NOTICED OPENING OF COLOSTOMY POUCH CUT TOO
LARGE, PERISTOMAL SKIN EXCORIATED, REDDEN, NO BLEEDING YET, MARATHON SKIN PREP
APPLIED W/ THERON CUT TO FIT HIGH OUTPUT POUCH, ADAPT RING APPLIED UNDER
WAFER, CONNECTED TO DEP DRAINAGE AS STOOL LIQ BROWN, SUPPLIES PLACED AT BS
 
RECOMMENDATIONS;
USE THERON HIGH OUTPUT POUCH AS LONG AS STOOL SO LIQ, ADAPT RING UNDER
WAFER, MARATHON PREP TILL PERISTOMAL AREA HEALED
STAFF RN AWARE

## 2020-12-14 NOTE — NUR
PT ADMITTED RELATED TO PEG TUBE MALFUNCTION. CM REVIEWED CHART AND SPOKE WITH
CARE TEAM. CM CALLED AND SPOKE WITH PT'S SON/JESSENIA ANNE. HE CINFIRMED THAT
PT HAD BEEN AT [facility] P[facility]. DC PLANNER HAD FAXCED CLINICAL INFO OVER TO
[facility] THIS AM AND CM HAD SPOKEN WITH LIAISON WHO INDICATED THAT THEY ARE
ABLE TO ACCEPT [T BACK WHENEVER HE IS MEDICALLY STABLE. CARE TEAM INDICATED
LIKELY DC READY IN 24-48HRS. SON INDICATED THAT THEY HAD STARTED TO DISCUSS DC
PLANNING AND THAT FAMILY WERE INTERESTED IN PT GOING TO [facility] [place]. CM TO DISCUSS WITH CARE TEAM IF DC TO [facility] OR SNF AT
[facility] WOULD BE APPROPRIATE. PT IS ON IV ZOSYN AND ZYVOX. CM TO FOLLOW AS
INDICATED WITH DC PLANNING.

## 2020-12-14 NOTE — NUR
PT CARE ASSUMED WITH PT IN BED WATCHING TV.PT IS TOTAL CARE.PT HAS A CHING
CATHETER,COLOSTOMY IN PLACE AND PEG TUBE.NEW BAG GLUCERNA 1.2 SET UP AT
40ML/HR.PT IS ACCUCHECK Q6H AND Q2H REPOSITIONING.WOUND DRESSING CHANGED.PT
HAS A MIDLINE ON TONIA.PT VERY AGITATED AND WANTS TO LEAVE AND DOESNT KNOW WITH
HE IS HERE AND WHERE HE IS.PT ALSO VERY CONFUSED BUT NOT IMPULSIVE.MEDICATIONS
ADMINISTERED VIA PEG TUBE.WILL CONTINUE TO MONITOR PER POC.PT HAS MITTEN
RESTRAINT FOR TRYING TO REMOVE PEG AND LINES.REASSESSMENT DONE EVERY 2HRS.WILL
CONTINUE TO MONITOR

## 2020-12-14 NOTE — NUR
ASSUMED CARE AT 0700. PT ASSESSMENTS PER CHART. TUBE FEEDING INCREASED UP TO
6ML/HR TODAY. WOUND DRESSING CHANGED. PT WIFE VISITED AND UPDATED ON POC.
PEG TUBE FUNCTIONING WELL THROUGHOUT SHIFT. MITTEN RESTRAINTS REMOVED AT 1015.
PROGRESSING TOWARDS POC GOALS.

## 2020-12-15 NOTE — NUR
WOUND CONSULT;
THE WOUND HAS SOME NON VIABLE TISSUE APPROX 5%. NO ODOR THE PATIENT WAS MOSTLY
COORPERATIVE AND MILDLY CONFUSED.  HE TOLERATED THE DRESSING APPLICATION WELL.
NO BLEEDING OR FRIABLE TISSUE NOTED.
 
NO OTHER RECOMMENDATIONS.
 
DISCUSSED WITH RN

## 2020-12-15 NOTE — NUR
ASSUMED CARE OF PT AT 1900HRS. PT AOX1 AND NEEDS MUST BE ANTICIPATED. FALL
PRECAUTION IN PLACE. PT IS CONFUSED. CHING IN PLACE AND PATIENT. COLOSTOMY
CONNECTED TO DEPENDENT DRAIN. IVF CONTINUED. PEG FLUSHED PER ORDER AND TF
GOING AT GOAL RATE. LAP IN PLACE. PT TURNED Q2-3H. VSS AND NO S/S OF ACUTE
DISTRESS. WILL CONTINUE TO MONITOR.

## 2020-12-15 NOTE — NUR
Received awake on bed. Due medications given as prescribed, crushed and given
thru PEG tube. On telemetry; no complains and signs of chest pain, crushing
sensation and heaviness. On room air. Vital signs stable. On blood sugar
monitoring, taken and recorded accordingly. Ongoing tube feeding of Glucerna
1.2 at 75mls/hr(goal rate); H2O flushes; residual checked. With webb in
place- draining well; output measured and recorded accordingly. With colostomy
in place- output measured and recorded accordingly. With D5 at 50cc/hr,
infusing well at R AC; with midline at L upper arm. Assisted in ADLs.
Falls bundle in place. On low airloss mattress; turned regularly.
With sacral wound- pt seen and examined by Wound nurse this am, wound vac
applied.
Pt seen and examined by Dr Delgadillo this AM, possible discharge tomorrow- CM
informed; may discontinue IVF.
To continue monitoring patient.

## 2020-12-15 NOTE — NUR
FAXED CLINICAL UPDATE TO [facility] RECEIVED CONFIRMATION AND LEFT MSG WITH
ANA MARÍA IN ADM THAT ANTICIPATE DC TOMORROW.

## 2020-12-16 NOTE — NUR
DWAYNE SPOKE WITH PT'S SON OMID THIS AM AND INDICATED THAT DC IS ANTICPATED
TODAY. HE ALSO EXPRESSED FRUSTRATION WITH COMMUNICATION FROM STAFF AT [facility].
DWAYNE INDICATED THAT DWAYNE CONVEYED ISSUE TO OUR LIAISON KALA. HE IS AWARE AND
AGREEABLE WITH DC BACK TO [facility] THIS DAY. DWAYNE SPOKE WITH KALA THIS AM SHE IS
ALSO AWARE. CM TO FOLLOW AS INDICATED WITH DC PLANNING.

## 2020-12-16 NOTE — NUR
Assumed pt care at 7am.Pt in bed sleeping on and off.Turned and repositioned
for comfort.Assessment completed.vss.Dr Delgadillo here,dc order noted.
arranged for transport after notified [facility].Wound vac dc,picture taken
and wet to dry drsg applied.Report off to christen rn and [facility].Wife at
bs most of the time till 1600 when picked up bt  jesika.

## 2020-12-16 NOTE — NUR
PT DISCHARGING TODAY TO [facility] FAXED DC ORDERS/SUMMARY TO
FACILITY SPOKE WITH DANIELLE IN ADM SHE RECEIVED ORDERS. TRANSPORT ARRANGED
WITH [facility] FOR [id number] TODAY. SW (JASMEET) TO NOTIFY PT'S
WIFE AND SON. UNIT NOTIFIED AND CHART COPY PER US. RN TO CALL REPORT
[phone].

## 2020-12-16 NOTE — NUR
WOUND DISCHAGE FOLLOW UP;
THE SACRAL WOUND VAC DRESSING REMOVED. UNREMARKABLE SINCE PRIOR VAC DRESSING
APPLICATION. NO ODOR CURRENTLY. APPLIED A WET TO DRY DRESSING.
 
DICHARGE IS IMMINENT.

## 2020-12-16 NOTE — NUR
ASSUMED CARE OF PT AT 1900HRS. PT AOX1 AND CONFUSED. FALL PRECAUTION IN PLACE.
PT TURNED Q2-3H. CHING IN PLACE. COLOSTOMY TO DD. TF AT GOAL RATE AND FLUSHED
Q6H PER ORDER. PEG HAS MINIMAL RESIDUAL. PT WAS ABLE TO GET COMFORTABLE AND
SLEEP PART OF THE SHIFT. VSS AND NO S/S OF ACUTE DISTRESS. WILL CONTINUE TO
MONITOR.

## 2021-01-05 ENCOUNTER — HOSPITAL ENCOUNTER (EMERGENCY)
Dept: HOSPITAL 35 - ER | Age: 76
Discharge: NURSING, REHAB OR LONG-TERM CARE | End: 2021-01-05
Payer: COMMERCIAL

## 2021-01-05 VITALS — HEIGHT: 69 IN | WEIGHT: 170 LBS | BODY MASS INDEX: 25.18 KG/M2

## 2021-01-05 VITALS — DIASTOLIC BLOOD PRESSURE: 80 MMHG | SYSTOLIC BLOOD PRESSURE: 134 MMHG

## 2021-01-05 DIAGNOSIS — I48.91: ICD-10-CM
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
## 2021-01-05 DIAGNOSIS — I10: ICD-10-CM

## 2021-01-05 DIAGNOSIS — Z79.82: ICD-10-CM

## 2021-01-05 DIAGNOSIS — E78.5: ICD-10-CM

## 2021-01-05 DIAGNOSIS — E11.9: ICD-10-CM

## 2021-01-05 DIAGNOSIS — Z95.5: ICD-10-CM

## 2021-01-05 DIAGNOSIS — F03.90: ICD-10-CM

## 2021-01-05 DIAGNOSIS — K94.23: Primary | ICD-10-CM
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
## 2021-01-05 DIAGNOSIS — I25.10: ICD-10-CM

## 2021-01-05 DIAGNOSIS — Z88.8: ICD-10-CM

## 2021-01-05 DIAGNOSIS — Z79.899: ICD-10-CM

## 2021-12-05 NOTE — NUR
ON-GOING ASSESSMENT: CM REVIEWED CHART. PHYSICAL THERAPY ATTEMPTED TO MEET
WITH PATIENT BUT HE IS STILL DECLINING ATTEMPTS TO MOBILIZE. PT DECLINED MOST
OF HIS AM MEDICATIONS. [facility] IS FOLLOWING BUT PT WILL NEED TO BE
COMPLIANT AND PARTICIATE WITH THERAPIES IN ORDER FOR THEM TO POSSIBLY ACCEPT.
CM WILL CONTINUE TO FOLLOW TO ASSIT AS NEEDED. DVT ppx: hep subc    Overall prognosis guarded. Recommend continued GOC discussions w family/HCP.

## 2022-04-12 NOTE — NUR
2259- Patient returned form bleeding scan. Patient is much more alert. Talking
asking questions, wanting to get out of bed. Wife at bedside. Patient calms
easily and goes back to sleep. Patient remains SB in the 50's. SBP improved.
Now 120's. O2 sat 100% on 2L. Still no stools here in the unit. Waiting for
bleeding scan results and will call Dr. Fernandez. Lab here to draw post infusion
hgb. Awaiting result. Updated wife on patient current condition. Will continue
to monitor. See documentation on interventions for assessment details. Face Mask

## 2023-01-27 NOTE — NUR
Assumed care 0700. Denied complaints of pain. Talked about being an actor
previously and an officer in the .  Refused breakfast, wanted to sleep
in.  Wanted his meds later-initially declining them. Thus AM meds were given
later.  Declined to eat the applesauce taken to him in lieu of breakfast. [email or web address] the [email or web address]